# Patient Record
Sex: FEMALE | Race: AMERICAN INDIAN OR ALASKA NATIVE | NOT HISPANIC OR LATINO | Employment: OTHER | URBAN - METROPOLITAN AREA
[De-identification: names, ages, dates, MRNs, and addresses within clinical notes are randomized per-mention and may not be internally consistent; named-entity substitution may affect disease eponyms.]

---

## 2022-12-29 ENCOUNTER — ANESTHESIA EVENT (OUTPATIENT)
Dept: SURGERY | Facility: CLINIC | Age: 62
End: 2022-12-29
Payer: COMMERCIAL

## 2022-12-29 ENCOUNTER — APPOINTMENT (OUTPATIENT)
Dept: RADIOLOGY | Facility: CLINIC | Age: 62
End: 2022-12-29
Attending: EMERGENCY MEDICINE
Payer: COMMERCIAL

## 2022-12-29 ENCOUNTER — APPOINTMENT (OUTPATIENT)
Dept: RADIOLOGY | Facility: CLINIC | Age: 62
End: 2022-12-29
Attending: STUDENT IN AN ORGANIZED HEALTH CARE EDUCATION/TRAINING PROGRAM
Payer: COMMERCIAL

## 2022-12-29 ENCOUNTER — APPOINTMENT (OUTPATIENT)
Dept: CT IMAGING | Facility: CLINIC | Age: 62
End: 2022-12-29
Attending: EMERGENCY MEDICINE
Payer: COMMERCIAL

## 2022-12-29 ENCOUNTER — ANESTHESIA (OUTPATIENT)
Dept: SURGERY | Facility: CLINIC | Age: 62
End: 2022-12-29
Payer: COMMERCIAL

## 2022-12-29 ENCOUNTER — APPOINTMENT (OUTPATIENT)
Dept: RADIOLOGY | Facility: CLINIC | Age: 62
End: 2022-12-29
Payer: COMMERCIAL

## 2022-12-29 ENCOUNTER — HOSPITAL ENCOUNTER (OUTPATIENT)
Facility: CLINIC | Age: 62
Setting detail: OBSERVATION
Discharge: HOME | End: 2022-12-30
Attending: EMERGENCY MEDICINE | Admitting: EMERGENCY MEDICINE
Payer: COMMERCIAL

## 2022-12-29 DIAGNOSIS — S82.201A CLOSED FRACTURE OF RIGHT TIBIA AND FIBULA, INITIAL ENCOUNTER: ICD-10-CM

## 2022-12-29 DIAGNOSIS — S82.401A CLOSED FRACTURE OF RIGHT TIBIA AND FIBULA, INITIAL ENCOUNTER: ICD-10-CM
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
## 2022-12-29 LAB
ANION GAP SERPL CALCULATED.3IONS-SCNC: 12 MMOL/L (ref 5–18)
APTT PPP: 27 SECONDS (ref 22–38)
ATRIAL RATE - MUSE: 87 BPM
BASOPHILS # BLD AUTO: 0 10E3/UL (ref 0–0.2)
BASOPHILS NFR BLD AUTO: 0 %
BUN SERPL-MCNC: 5 MG/DL (ref 8–22)
CALCIUM SERPL-MCNC: 8.9 MG/DL (ref 8.5–10.5)
CHLORIDE BLD-SCNC: 108 MMOL/L (ref 98–107)
CO2 SERPL-SCNC: 20 MMOL/L (ref 22–31)
CREAT SERPL-MCNC: 0.62 MG/DL (ref 0.6–1.1)
DIASTOLIC BLOOD PRESSURE - MUSE: 76 MMHG
EOSINOPHIL # BLD AUTO: 0 10E3/UL (ref 0–0.7)
EOSINOPHIL NFR BLD AUTO: 0 %
ERYTHROCYTE [DISTWIDTH] IN BLOOD BY AUTOMATED COUNT: 12.1 % (ref 10–15)
GFR SERPL CREATININE-BSD FRML MDRD: >90 ML/MIN/1.73M2
GLUCOSE BLD-MCNC: 114 MG/DL (ref 70–125)
GLUCOSE BLDC GLUCOMTR-MCNC: 120 MG/DL (ref 70–99)
HCT VFR BLD AUTO: 41 % (ref 35–47)
HGB BLD-MCNC: 13.7 G/DL (ref 11.7–15.7)
IMM GRANULOCYTES # BLD: 0 10E3/UL
IMM GRANULOCYTES NFR BLD: 0 %
INR PPP: 1 (ref 0.85–1.15)
INTERPRETATION ECG - MUSE: NORMAL
LYMPHOCYTES # BLD AUTO: 1.1 10E3/UL (ref 0.8–5.3)
LYMPHOCYTES NFR BLD AUTO: 14 %
MCH RBC QN AUTO: 30.9 PG (ref 26.5–33)
MCHC RBC AUTO-ENTMCNC: 33.4 G/DL (ref 31.5–36.5)
MCV RBC AUTO: 93 FL (ref 78–100)
MONOCYTES # BLD AUTO: 0.6 10E3/UL (ref 0–1.3)
MONOCYTES NFR BLD AUTO: 7 %
NEUTROPHILS # BLD AUTO: 6 10E3/UL (ref 1.6–8.3)
NEUTROPHILS NFR BLD AUTO: 79 %
NRBC # BLD AUTO: 0 10E3/UL
NRBC BLD AUTO-RTO: 0 /100
P AXIS - MUSE: 59 DEGREES
PLATELET # BLD AUTO: 337 10E3/UL (ref 150–450)
POTASSIUM BLD-SCNC: 3.6 MMOL/L (ref 3.5–5)
PR INTERVAL - MUSE: 156 MS
QRS DURATION - MUSE: 84 MS
QT - MUSE: 348 MS
QTC - MUSE: 418 MS
R AXIS - MUSE: 62 DEGREES
RBC # BLD AUTO: 4.43 10E6/UL (ref 3.8–5.2)
SODIUM SERPL-SCNC: 140 MMOL/L (ref 136–145)
SYSTOLIC BLOOD PRESSURE - MUSE: 152 MMHG
T AXIS - MUSE: 75 DEGREES
VENTRICULAR RATE- MUSE: 87 BPM
WBC # BLD AUTO: 7.7 10E3/UL (ref 4–11)

## 2022-12-29 PROCEDURE — C1769 GUIDE WIRE: HCPCS | Performed by: STUDENT IN AN ORGANIZED HEALTH CARE EDUCATION/TRAINING PROGRAM

## 2022-12-29 PROCEDURE — 272N000001 HC OR GENERAL SUPPLY STERILE: Performed by: STUDENT IN AN ORGANIZED HEALTH CARE EDUCATION/TRAINING PROGRAM

## 2022-12-29 PROCEDURE — 120N000001 HC R&B MED SURG/OB

## 2022-12-29 PROCEDURE — 73700 CT LOWER EXTREMITY W/O DYE: CPT | Mod: RT

## 2022-12-29 PROCEDURE — 80048 BASIC METABOLIC PNL TOTAL CA: CPT | Performed by: EMERGENCY MEDICINE

## 2022-12-29 PROCEDURE — 85610 PROTHROMBIN TIME: CPT | Performed by: EMERGENCY MEDICINE

## 2022-12-29 PROCEDURE — 250N000009 HC RX 250: Performed by: ANESTHESIOLOGY

## 2022-12-29 PROCEDURE — 258N000003 HC RX IP 258 OP 636: Performed by: ANESTHESIOLOGY

## 2022-12-29 PROCEDURE — 36415 COLL VENOUS BLD VENIPUNCTURE: CPT | Performed by: EMERGENCY MEDICINE

## 2022-12-29 PROCEDURE — 258N000003 HC RX IP 258 OP 636

## 2022-12-29 PROCEDURE — 85730 THROMBOPLASTIN TIME PARTIAL: CPT | Performed by: EMERGENCY MEDICINE

## 2022-12-29 PROCEDURE — 93005 ELECTROCARDIOGRAM TRACING: CPT | Performed by: EMERGENCY MEDICINE

## 2022-12-29 PROCEDURE — 250N000009 HC RX 250: Performed by: STUDENT IN AN ORGANIZED HEALTH CARE EDUCATION/TRAINING PROGRAM

## 2022-12-29 PROCEDURE — 999N000141 HC STATISTIC PRE-PROCEDURE NURSING ASSESSMENT: Performed by: STUDENT IN AN ORGANIZED HEALTH CARE EDUCATION/TRAINING PROGRAM

## 2022-12-29 PROCEDURE — 73610 X-RAY EXAM OF ANKLE: CPT | Mod: RT

## 2022-12-29 PROCEDURE — 85025 COMPLETE CBC W/AUTO DIFF WBC: CPT | Performed by: EMERGENCY MEDICINE

## 2022-12-29 PROCEDURE — 710N000010 HC RECOVERY PHASE 1, LEVEL 2, PER MIN: Performed by: STUDENT IN AN ORGANIZED HEALTH CARE EDUCATION/TRAINING PROGRAM

## 2022-12-29 PROCEDURE — 258N000003 HC RX IP 258 OP 636: Performed by: NURSE ANESTHETIST, CERTIFIED REGISTERED

## 2022-12-29 PROCEDURE — 99219 PR INITIAL OBSERVATION CARE,LEVEL II: CPT | Mod: GC

## 2022-12-29 PROCEDURE — 29515 APPLICATION SHORT LEG SPLINT: CPT

## 2022-12-29 PROCEDURE — 250N000013 HC RX MED GY IP 250 OP 250 PS 637

## 2022-12-29 PROCEDURE — 73590 X-RAY EXAM OF LOWER LEG: CPT | Mod: RT

## 2022-12-29 PROCEDURE — 250N000013 HC RX MED GY IP 250 OP 250 PS 637: Performed by: EMERGENCY MEDICINE

## 2022-12-29 PROCEDURE — 250N000011 HC RX IP 250 OP 636: Performed by: STUDENT IN AN ORGANIZED HEALTH CARE EDUCATION/TRAINING PROGRAM

## 2022-12-29 PROCEDURE — 250N000009 HC RX 250: Performed by: NURSE ANESTHETIST, CERTIFIED REGISTERED

## 2022-12-29 PROCEDURE — 29505 APPLICATION LONG LEG SPLINT: CPT

## 2022-12-29 PROCEDURE — C1713 ANCHOR/SCREW BN/BN,TIS/BN: HCPCS | Performed by: STUDENT IN AN ORGANIZED HEALTH CARE EDUCATION/TRAINING PROGRAM

## 2022-12-29 PROCEDURE — 360N000084 HC SURGERY LEVEL 4 W/ FLUORO, PER MIN: Performed by: STUDENT IN AN ORGANIZED HEALTH CARE EDUCATION/TRAINING PROGRAM

## 2022-12-29 PROCEDURE — 999N000065 XR TIBIA & FIBULA PORT RIGHT 2 VIEWS: Mod: RT

## 2022-12-29 PROCEDURE — 250N000011 HC RX IP 250 OP 636: Performed by: NURSE ANESTHETIST, CERTIFIED REGISTERED

## 2022-12-29 PROCEDURE — 250N000011 HC RX IP 250 OP 636: Performed by: ANESTHESIOLOGY

## 2022-12-29 PROCEDURE — 370N000017 HC ANESTHESIA TECHNICAL FEE, PER MIN: Performed by: STUDENT IN AN ORGANIZED HEALTH CARE EDUCATION/TRAINING PROGRAM

## 2022-12-29 PROCEDURE — 999N000182 XR SURGERY CARM FLUORO GREATER THAN 5 MIN: Mod: TC

## 2022-12-29 PROCEDURE — 250N000025 HC SEVOFLURANE, PER MIN: Performed by: STUDENT IN AN ORGANIZED HEALTH CARE EDUCATION/TRAINING PROGRAM

## 2022-12-29 PROCEDURE — 99285 EMERGENCY DEPT VISIT HI MDM: CPT | Mod: 25

## 2022-12-29 PROCEDURE — 250N000011 HC RX IP 250 OP 636: Performed by: ORTHOPAEDIC SURGERY

## 2022-12-29 PROCEDURE — 278N000051 HC OR IMPLANT GENERAL: Performed by: STUDENT IN AN ORGANIZED HEALTH CARE EDUCATION/TRAINING PROGRAM

## 2022-12-29 DEVICE — IMP SCR SYN 3.5X16MM LOCKING W/STARDRIVE SS 212.104: Type: IMPLANTABLE DEVICE | Site: LEG | Status: FUNCTIONAL

## 2022-12-29 DEVICE — IMP SCR SYN CAN 4.0X40MM FT SS 206.040: Type: IMPLANTABLE DEVICE | Site: LEG | Status: FUNCTIONAL

## 2022-12-29 DEVICE — IMP SCR SYN CORTEX 3.5X32MM SELF TAP SS 204.832: Type: IMPLANTABLE DEVICE | Site: LEG | Status: FUNCTIONAL

## 2022-12-29 DEVICE — IMP SCR SYN CORTEX 3.5X16MM SELF TAP SS 204.816: Type: IMPLANTABLE DEVICE | Site: LEG | Status: FUNCTIONAL

## 2022-12-29 DEVICE — LOCKING SCREW FOR IM NAIL Ø 5MM/ 42MM/ XL25/ STERILE: Type: IMPLANTABLE DEVICE | Site: LEG | Status: FUNCTIONAL

## 2022-12-29 DEVICE — IMP SCR SYN CORTEX 3.5X14MM SELF TAP SS 204.814: Type: IMPLANTABLE DEVICE | Site: LEG | Status: FUNCTIONAL

## 2022-12-29 DEVICE — IMP PLATE SYN 1/3 TUBULAR 93MM 08H SS 241.381: Type: IMPLANTABLE DEVICE | Site: LEG | Status: FUNCTIONAL

## 2022-12-29 DEVICE — IMP PLATE SYN 1/3 TUBULAR 57MM 05H SS 241.351: Type: IMPLANTABLE DEVICE | Site: LEG | Status: FUNCTIONAL

## 2022-12-29 DEVICE — LOCKING SCREW FOR IM NAIL Ø 5MM/ 32MM/ XL25/ STERILE: Type: IMPLANTABLE DEVICE | Site: LEG | Status: FUNCTIONAL

## 2022-12-29 DEVICE — IMP SCR SYN CORTEX 3.5X38MM SELF TAP SS 204.838: Type: IMPLANTABLE DEVICE | Site: LEG | Status: FUNCTIONAL

## 2022-12-29 DEVICE — LOCKING SCREW FOR IM NAIL Ø 5MM/ 44MM/ XL25/ STERILE: Type: IMPLANTABLE DEVICE | Site: LEG | Status: FUNCTIONAL

## 2022-12-29 DEVICE — LOCKING SCREW FOR IM NAIL Ø 5MM/ 36MM/ XL25/ STERILE: Type: IMPLANTABLE DEVICE | Site: LEG | Status: FUNCTIONAL

## 2022-12-29 DEVICE — IMP SCR SYN CORTEX 2.7X16MM SELF TAP SS 202.816: Type: IMPLANTABLE DEVICE | Site: LEG | Status: FUNCTIONAL

## 2022-12-29 DEVICE — IMP SCR SYN CORTEX 3.5X36MM SELF TAP SS 204.836: Type: IMPLANTABLE DEVICE | Site: LEG | Status: FUNCTIONAL

## 2022-12-29 DEVICE — IMP SCR SYN 3.5X14MM LOCKING W/STARDRIVE SS 212.103: Type: IMPLANTABLE DEVICE | Site: LEG | Status: FUNCTIONAL

## 2022-12-29 DEVICE — IMP SCR SYN CORTEX 3.5X34MM SELF TAP SS 204.834: Type: IMPLANTABLE DEVICE | Site: LEG | Status: FUNCTIONAL

## 2022-12-29 DEVICE — IMP SCR SYN CORTEX 3.5X30MM SELF TAP SS 204.830: Type: IMPLANTABLE DEVICE | Site: LEG | Status: FUNCTIONAL

## 2022-12-29 DEVICE — LOCKING SCREW FOR IM NAIL Ø 5MM/ 40MM/ XL25/ STERILE: Type: IMPLANTABLE DEVICE | Site: LEG | Status: FUNCTIONAL

## 2022-12-29 RX ORDER — CEFAZOLIN SODIUM 2 G/100ML
2 INJECTION, SOLUTION INTRAVENOUS
Status: DISCONTINUED | OUTPATIENT
Start: 2022-12-29 | End: 2022-12-29

## 2022-12-29 RX ORDER — LIDOCAINE 40 MG/G
CREAM TOPICAL
Status: DISCONTINUED | OUTPATIENT
Start: 2022-12-29 | End: 2022-12-30 | Stop reason: HOSPADM

## 2022-12-29 RX ORDER — LIDOCAINE 40 MG/G
CREAM TOPICAL
Status: DISCONTINUED | OUTPATIENT
Start: 2022-12-29 | End: 2022-12-29 | Stop reason: HOSPADM

## 2022-12-29 RX ORDER — OXYCODONE HYDROCHLORIDE 5 MG/1
5 TABLET ORAL
Status: DISCONTINUED | OUTPATIENT
Start: 2022-12-29 | End: 2022-12-29

## 2022-12-29 RX ORDER — ONDANSETRON 4 MG/1
4 TABLET, ORALLY DISINTEGRATING ORAL EVERY 6 HOURS PRN
Status: DISCONTINUED | OUTPATIENT
Start: 2022-12-29 | End: 2022-12-30 | Stop reason: HOSPADM

## 2022-12-29 RX ORDER — ONDANSETRON 2 MG/ML
4 INJECTION INTRAMUSCULAR; INTRAVENOUS EVERY 6 HOURS PRN
Status: DISCONTINUED | OUTPATIENT
Start: 2022-12-29 | End: 2022-12-29

## 2022-12-29 RX ORDER — ONDANSETRON 2 MG/ML
4 INJECTION INTRAMUSCULAR; INTRAVENOUS EVERY 6 HOURS PRN
Status: DISCONTINUED | OUTPATIENT
Start: 2022-12-29 | End: 2022-12-30 | Stop reason: HOSPADM

## 2022-12-29 RX ORDER — HYDROMORPHONE HCL IN WATER/PF 6 MG/30 ML
0.2 PATIENT CONTROLLED ANALGESIA SYRINGE INTRAVENOUS
Status: DISCONTINUED | OUTPATIENT
Start: 2022-12-29 | End: 2022-12-30 | Stop reason: HOSPADM

## 2022-12-29 RX ORDER — FENTANYL CITRATE 50 UG/ML
50 INJECTION, SOLUTION INTRAMUSCULAR; INTRAVENOUS EVERY 5 MIN PRN
Status: DISCONTINUED | OUTPATIENT
Start: 2022-12-29 | End: 2022-12-29

## 2022-12-29 RX ORDER — FENTANYL CITRATE 50 UG/ML
25 INJECTION, SOLUTION INTRAMUSCULAR; INTRAVENOUS EVERY 5 MIN PRN
Status: DISCONTINUED | OUTPATIENT
Start: 2022-12-29 | End: 2022-12-29

## 2022-12-29 RX ORDER — ACETAMINOPHEN 325 MG/1
650 TABLET ORAL EVERY 4 HOURS PRN
Status: DISCONTINUED | OUTPATIENT
Start: 2023-01-01 | End: 2022-12-30 | Stop reason: HOSPADM

## 2022-12-29 RX ORDER — PROCHLORPERAZINE MALEATE 10 MG
10 TABLET ORAL EVERY 6 HOURS PRN
Status: DISCONTINUED | OUTPATIENT
Start: 2022-12-29 | End: 2022-12-30 | Stop reason: HOSPADM

## 2022-12-29 RX ORDER — OXYCODONE HYDROCHLORIDE 5 MG/1
5 TABLET ORAL EVERY 4 HOURS PRN
Status: DISCONTINUED | OUTPATIENT
Start: 2022-12-29 | End: 2022-12-30 | Stop reason: HOSPADM

## 2022-12-29 RX ORDER — ACETAMINOPHEN 325 MG/1
975 TABLET ORAL EVERY 8 HOURS
Status: DISCONTINUED | OUTPATIENT
Start: 2022-12-29 | End: 2022-12-29

## 2022-12-29 RX ORDER — LABETALOL HYDROCHLORIDE 5 MG/ML
10 INJECTION, SOLUTION INTRAVENOUS
Status: DISCONTINUED | OUTPATIENT
Start: 2022-12-29 | End: 2022-12-30 | Stop reason: HOSPADM

## 2022-12-29 RX ORDER — HYDRALAZINE HYDROCHLORIDE 20 MG/ML
10 INJECTION INTRAMUSCULAR; INTRAVENOUS EVERY 6 HOURS PRN
Status: DISCONTINUED | OUTPATIENT
Start: 2022-12-29 | End: 2022-12-30 | Stop reason: HOSPADM

## 2022-12-29 RX ORDER — CEFAZOLIN SODIUM 1 G/3ML
1 INJECTION, POWDER, FOR SOLUTION INTRAMUSCULAR; INTRAVENOUS EVERY 8 HOURS
Status: COMPLETED | OUTPATIENT
Start: 2022-12-30 | End: 2022-12-30

## 2022-12-29 RX ORDER — NALOXONE HYDROCHLORIDE 0.4 MG/ML
0.2 INJECTION, SOLUTION INTRAMUSCULAR; INTRAVENOUS; SUBCUTANEOUS
Status: DISCONTINUED | OUTPATIENT
Start: 2022-12-29 | End: 2022-12-30 | Stop reason: HOSPADM

## 2022-12-29 RX ORDER — FENTANYL CITRATE 50 UG/ML
100 INJECTION, SOLUTION INTRAMUSCULAR; INTRAVENOUS
Status: COMPLETED | OUTPATIENT
Start: 2022-12-29 | End: 2022-12-29

## 2022-12-29 RX ORDER — CEFAZOLIN SODIUM 2 G/100ML
2 INJECTION, SOLUTION INTRAVENOUS SEE ADMIN INSTRUCTIONS
Status: DISCONTINUED | OUTPATIENT
Start: 2022-12-29 | End: 2022-12-29

## 2022-12-29 RX ORDER — HYDROMORPHONE HCL IN WATER/PF 6 MG/30 ML
0.4 PATIENT CONTROLLED ANALGESIA SYRINGE INTRAVENOUS
Status: DISCONTINUED | OUTPATIENT
Start: 2022-12-29 | End: 2022-12-30 | Stop reason: HOSPADM

## 2022-12-29 RX ORDER — POLYETHYLENE GLYCOL 3350 17 G/17G
17 POWDER, FOR SOLUTION ORAL DAILY
Status: DISCONTINUED | OUTPATIENT
Start: 2022-12-30 | End: 2022-12-30 | Stop reason: HOSPADM

## 2022-12-29 RX ORDER — BUPIVACAINE HYDROCHLORIDE 2.5 MG/ML
INJECTION, SOLUTION EPIDURAL; INFILTRATION; INTRACAUDAL PRN
Status: DISCONTINUED | OUTPATIENT
Start: 2022-12-29 | End: 2022-12-29 | Stop reason: HOSPADM

## 2022-12-29 RX ORDER — LIDOCAINE 40 MG/G
CREAM TOPICAL
Status: DISCONTINUED | OUTPATIENT
Start: 2022-12-29 | End: 2022-12-29

## 2022-12-29 RX ORDER — CEFAZOLIN SODIUM/WATER 2 G/20 ML
2 SYRINGE (ML) INTRAVENOUS SEE ADMIN INSTRUCTIONS
Status: DISCONTINUED | OUTPATIENT
Start: 2022-12-29 | End: 2022-12-29 | Stop reason: HOSPADM

## 2022-12-29 RX ORDER — SODIUM CHLORIDE, SODIUM LACTATE, POTASSIUM CHLORIDE, CALCIUM CHLORIDE 600; 310; 30; 20 MG/100ML; MG/100ML; MG/100ML; MG/100ML
INJECTION, SOLUTION INTRAVENOUS CONTINUOUS
Status: DISCONTINUED | OUTPATIENT
Start: 2022-12-29 | End: 2022-12-29 | Stop reason: HOSPADM

## 2022-12-29 RX ORDER — DEXAMETHASONE SODIUM PHOSPHATE 10 MG/ML
INJECTION, SOLUTION INTRAMUSCULAR; INTRAVENOUS PRN
Status: DISCONTINUED | OUTPATIENT
Start: 2022-12-29 | End: 2022-12-29

## 2022-12-29 RX ORDER — ONDANSETRON 2 MG/ML
4 INJECTION INTRAMUSCULAR; INTRAVENOUS ONCE
Status: DISCONTINUED | OUTPATIENT
Start: 2022-12-29 | End: 2022-12-30 | Stop reason: HOSPADM

## 2022-12-29 RX ORDER — PROPOFOL 10 MG/ML
INJECTION, EMULSION INTRAVENOUS PRN
Status: DISCONTINUED | OUTPATIENT
Start: 2022-12-29 | End: 2022-12-29

## 2022-12-29 RX ORDER — SODIUM CHLORIDE, SODIUM LACTATE, POTASSIUM CHLORIDE, CALCIUM CHLORIDE 600; 310; 30; 20 MG/100ML; MG/100ML; MG/100ML; MG/100ML
INJECTION, SOLUTION INTRAVENOUS CONTINUOUS
Status: DISCONTINUED | OUTPATIENT
Start: 2022-12-29 | End: 2022-12-30

## 2022-12-29 RX ORDER — ONDANSETRON 4 MG/1
4 TABLET, ORALLY DISINTEGRATING ORAL EVERY 6 HOURS PRN
Status: DISCONTINUED | OUTPATIENT
Start: 2022-12-29 | End: 2022-12-29

## 2022-12-29 RX ORDER — BISACODYL 10 MG
10 SUPPOSITORY, RECTAL RECTAL DAILY PRN
Status: DISCONTINUED | OUTPATIENT
Start: 2022-12-29 | End: 2022-12-30 | Stop reason: HOSPADM

## 2022-12-29 RX ORDER — AMLODIPINE BESYLATE 10 MG/1
10 TABLET ORAL DAILY
COMMUNITY

## 2022-12-29 RX ORDER — AMOXICILLIN 250 MG
1 CAPSULE ORAL 2 TIMES DAILY
Status: DISCONTINUED | OUTPATIENT
Start: 2022-12-29 | End: 2022-12-30 | Stop reason: HOSPADM

## 2022-12-29 RX ORDER — MAGNESIUM SULFATE 4 G/50ML
4 INJECTION INTRAVENOUS ONCE
Status: COMPLETED | OUTPATIENT
Start: 2022-12-29 | End: 2022-12-29

## 2022-12-29 RX ORDER — SODIUM CHLORIDE 9 MG/ML
INJECTION, SOLUTION INTRAVENOUS CONTINUOUS
Status: DISCONTINUED | OUTPATIENT
Start: 2022-12-29 | End: 2022-12-30

## 2022-12-29 RX ORDER — CEFAZOLIN SODIUM/WATER 2 G/20 ML
2 SYRINGE (ML) INTRAVENOUS
Status: COMPLETED | OUTPATIENT
Start: 2022-12-29 | End: 2022-12-29

## 2022-12-29 RX ORDER — MEPERIDINE HYDROCHLORIDE 50 MG/ML
12.5 INJECTION INTRAMUSCULAR; INTRAVENOUS; SUBCUTANEOUS EVERY 5 MIN PRN
Status: DISCONTINUED | OUTPATIENT
Start: 2022-12-29 | End: 2022-12-30 | Stop reason: HOSPADM

## 2022-12-29 RX ORDER — NALOXONE HYDROCHLORIDE 0.4 MG/ML
0.4 INJECTION, SOLUTION INTRAMUSCULAR; INTRAVENOUS; SUBCUTANEOUS
Status: DISCONTINUED | OUTPATIENT
Start: 2022-12-29 | End: 2022-12-30 | Stop reason: HOSPADM

## 2022-12-29 RX ORDER — ALBUTEROL SULFATE 0.83 MG/ML
2.5 SOLUTION RESPIRATORY (INHALATION) EVERY 4 HOURS PRN
Status: DISCONTINUED | OUTPATIENT
Start: 2022-12-29 | End: 2022-12-30 | Stop reason: HOSPADM

## 2022-12-29 RX ORDER — OXYCODONE AND ACETAMINOPHEN 5; 325 MG/1; MG/1
1 TABLET ORAL ONCE
Status: COMPLETED | OUTPATIENT
Start: 2022-12-29 | End: 2022-12-29

## 2022-12-29 RX ORDER — MORPHINE SULFATE 4 MG/ML
4 INJECTION, SOLUTION INTRAMUSCULAR; INTRAVENOUS EVERY 30 MIN PRN
Status: DISCONTINUED | OUTPATIENT
Start: 2022-12-29 | End: 2022-12-29

## 2022-12-29 RX ORDER — IBUPROFEN 600 MG/1
600 TABLET, FILM COATED ORAL ONCE
Status: COMPLETED | OUTPATIENT
Start: 2022-12-29 | End: 2022-12-29

## 2022-12-29 RX ORDER — ASPIRIN 81 MG/1
81 TABLET ORAL 2 TIMES DAILY
Status: DISCONTINUED | OUTPATIENT
Start: 2022-12-29 | End: 2022-12-30 | Stop reason: HOSPADM

## 2022-12-29 RX ORDER — POLYETHYLENE GLYCOL 3350 17 G/17G
17 POWDER, FOR SOLUTION ORAL DAILY PRN
Status: DISCONTINUED | OUTPATIENT
Start: 2022-12-29 | End: 2022-12-30 | Stop reason: HOSPADM

## 2022-12-29 RX ORDER — ONDANSETRON 2 MG/ML
4 INJECTION INTRAMUSCULAR; INTRAVENOUS EVERY 30 MIN PRN
Status: DISCONTINUED | OUTPATIENT
Start: 2022-12-29 | End: 2022-12-29

## 2022-12-29 RX ORDER — ONDANSETRON 4 MG/1
4 TABLET, ORALLY DISINTEGRATING ORAL EVERY 30 MIN PRN
Status: DISCONTINUED | OUTPATIENT
Start: 2022-12-29 | End: 2022-12-29

## 2022-12-29 RX ORDER — ONDANSETRON 2 MG/ML
INJECTION INTRAMUSCULAR; INTRAVENOUS PRN
Status: DISCONTINUED | OUTPATIENT
Start: 2022-12-29 | End: 2022-12-29

## 2022-12-29 RX ORDER — AMLODIPINE BESYLATE 10 MG/1
10 TABLET ORAL DAILY
Status: DISCONTINUED | OUTPATIENT
Start: 2022-12-29 | End: 2022-12-30 | Stop reason: HOSPADM

## 2022-12-29 RX ORDER — HYDROMORPHONE HCL IN WATER/PF 6 MG/30 ML
0.2 PATIENT CONTROLLED ANALGESIA SYRINGE INTRAVENOUS EVERY 5 MIN PRN
Status: DISCONTINUED | OUTPATIENT
Start: 2022-12-29 | End: 2022-12-29

## 2022-12-29 RX ORDER — SODIUM CHLORIDE, SODIUM LACTATE, POTASSIUM CHLORIDE, CALCIUM CHLORIDE 600; 310; 30; 20 MG/100ML; MG/100ML; MG/100ML; MG/100ML
INJECTION, SOLUTION INTRAVENOUS CONTINUOUS
Status: DISCONTINUED | OUTPATIENT
Start: 2022-12-29 | End: 2022-12-29

## 2022-12-29 RX ORDER — HALOPERIDOL 5 MG/ML
1 INJECTION INTRAMUSCULAR
Status: DISCONTINUED | OUTPATIENT
Start: 2022-12-29 | End: 2022-12-30 | Stop reason: HOSPADM

## 2022-12-29 RX ORDER — HYDROMORPHONE HCL IN WATER/PF 6 MG/30 ML
0.4 PATIENT CONTROLLED ANALGESIA SYRINGE INTRAVENOUS EVERY 5 MIN PRN
Status: DISCONTINUED | OUTPATIENT
Start: 2022-12-29 | End: 2022-12-29

## 2022-12-29 RX ORDER — MAGNESIUM HYDROXIDE 1200 MG/15ML
LIQUID ORAL PRN
Status: DISCONTINUED | OUTPATIENT
Start: 2022-12-29 | End: 2022-12-29 | Stop reason: HOSPADM

## 2022-12-29 RX ORDER — ACETAMINOPHEN 325 MG/1
975 TABLET ORAL EVERY 8 HOURS
Status: DISCONTINUED | OUTPATIENT
Start: 2022-12-29 | End: 2022-12-30 | Stop reason: HOSPADM

## 2022-12-29 RX ADMIN — LIDOCAINE HYDROCHLORIDE 20 MG: 10 INJECTION, SOLUTION INFILTRATION; PERINEURAL at 13:16

## 2022-12-29 RX ADMIN — IBUPROFEN 600 MG: 600 TABLET ORAL at 02:27

## 2022-12-29 RX ADMIN — ACETAMINOPHEN 975 MG: 325 TABLET ORAL at 09:45

## 2022-12-29 RX ADMIN — SUGAMMADEX 200 MG: 100 INJECTION, SOLUTION INTRAVENOUS at 17:03

## 2022-12-29 RX ADMIN — AMLODIPINE BESYLATE 10 MG: 10 TABLET ORAL at 09:45

## 2022-12-29 RX ADMIN — FENTANYL CITRATE 100 MCG: 50 INJECTION, SOLUTION INTRAMUSCULAR; INTRAVENOUS at 13:16

## 2022-12-29 RX ADMIN — MIDAZOLAM 2 MG: 1 INJECTION INTRAMUSCULAR; INTRAVENOUS at 13:05

## 2022-12-29 RX ADMIN — ACETAMINOPHEN 975 MG: 325 TABLET ORAL at 20:02

## 2022-12-29 RX ADMIN — ACETAMINOPHEN 325 MG: 325 TABLET ORAL at 12:46

## 2022-12-29 RX ADMIN — ROCURONIUM BROMIDE 10 MG: 10 INJECTION, SOLUTION INTRAVENOUS at 15:00

## 2022-12-29 RX ADMIN — SENNOSIDES AND DOCUSATE SODIUM 1 TABLET: 50; 8.6 TABLET ORAL at 20:02

## 2022-12-29 RX ADMIN — DEXAMETHASONE SODIUM PHOSPHATE 10 MG: 10 INJECTION, SOLUTION INTRAMUSCULAR; INTRAVENOUS at 13:16

## 2022-12-29 RX ADMIN — Medication 2 G: at 13:05

## 2022-12-29 RX ADMIN — ROCURONIUM BROMIDE 10 MG: 10 INJECTION, SOLUTION INTRAVENOUS at 16:19

## 2022-12-29 RX ADMIN — PHENYLEPHRINE HYDROCHLORIDE 200 MCG: 10 INJECTION INTRAVENOUS at 13:58

## 2022-12-29 RX ADMIN — ROCURONIUM BROMIDE 10 MG: 10 INJECTION, SOLUTION INTRAVENOUS at 16:03

## 2022-12-29 RX ADMIN — ROCURONIUM BROMIDE 50 MG: 10 INJECTION, SOLUTION INTRAVENOUS at 13:16

## 2022-12-29 RX ADMIN — SODIUM CHLORIDE: 9 INJECTION, SOLUTION INTRAVENOUS at 22:03

## 2022-12-29 RX ADMIN — SODIUM CHLORIDE: 9 INJECTION, SOLUTION INTRAVENOUS at 07:34

## 2022-12-29 RX ADMIN — ROCURONIUM BROMIDE 20 MG: 10 INJECTION, SOLUTION INTRAVENOUS at 14:27

## 2022-12-29 RX ADMIN — OXYCODONE HYDROCHLORIDE AND ACETAMINOPHEN 1 TABLET: 5; 325 TABLET ORAL at 02:27

## 2022-12-29 RX ADMIN — MAGNESIUM SULFATE HEPTAHYDRATE 4 G: 80 INJECTION, SOLUTION INTRAVENOUS at 12:49

## 2022-12-29 RX ADMIN — Medication 2 G: at 17:05

## 2022-12-29 RX ADMIN — SODIUM CHLORIDE, POTASSIUM CHLORIDE, SODIUM LACTATE AND CALCIUM CHLORIDE: 600; 310; 30; 20 INJECTION, SOLUTION INTRAVENOUS at 14:30

## 2022-12-29 RX ADMIN — ONDANSETRON 4 MG: 2 INJECTION INTRAMUSCULAR; INTRAVENOUS at 13:16

## 2022-12-29 RX ADMIN — ASPIRIN 81 MG: 81 TABLET ORAL at 20:02

## 2022-12-29 RX ADMIN — SODIUM CHLORIDE, POTASSIUM CHLORIDE, SODIUM LACTATE AND CALCIUM CHLORIDE: 600; 310; 30; 20 INJECTION, SOLUTION INTRAVENOUS at 13:00

## 2022-12-29 RX ADMIN — HYDROMORPHONE HYDROCHLORIDE 1 MG: 1 INJECTION, SOLUTION INTRAMUSCULAR; INTRAVENOUS; SUBCUTANEOUS at 14:25

## 2022-12-29 RX ADMIN — PROPOFOL 200 MG: 10 INJECTION, EMULSION INTRAVENOUS at 13:16

## 2022-12-29 RX ADMIN — PROPOFOL 100 MCG/KG/MIN: 10 INJECTION, EMULSION INTRAVENOUS at 13:30

## 2022-12-29 ASSESSMENT — ACTIVITIES OF DAILY LIVING (ADL)
ADLS_ACUITY_SCORE: 42
ADLS_ACUITY_SCORE: 35
ADLS_ACUITY_SCORE: 37
ADLS_ACUITY_SCORE: 35
ADLS_ACUITY_SCORE: 37
ADLS_ACUITY_SCORE: 35
ADLS_ACUITY_SCORE: 35
ADLS_ACUITY_SCORE: 37

## 2022-12-29 ASSESSMENT — ENCOUNTER SYMPTOMS
ARTHRALGIAS: 1
SEIZURES: 0

## 2022-12-29 ASSESSMENT — COPD QUESTIONNAIRES: COPD: 0

## 2022-12-29 NOTE — ANESTHESIA PROCEDURE NOTES
Airway       Patient location during procedure: OR       Procedure Start/Stop Times: 12/29/2022 1:19 PM  Staff -        Anesthesiologist:  Brayden Staton MD       CRNA: Jeanie Bunch APRN CRNA       Performed By: CRNA  Consent for Airway        Urgency: elective  Indications and Patient Condition       Indications for airway management: soniya-procedural and airway protection       Induction type:intravenous       Mask difficulty assessment: 2 - vent by mask + OA or adjuvant +/- NMBA    Final Airway Details       Final airway type: endotracheal airway       Successful airway: ETT - single  Endotracheal Airway Details        ETT size (mm): 7.0       Cuffed: yes       Successful intubation technique: video laryngoscopy       VL Blade Size: Glidescope 3       Grade View of Cords: 1       Adjucts: stylet       Position: Right       Measured from: lips       Secured at (cm): 21       Bite block used: None    Post intubation assessment        Placement verified by: capnometry, equal breath sounds and chest rise        Number of attempts at approach: 1       Number of other approaches attempted: 0       Secured with: silk tape and commercial tube diaz       Ease of procedure: easy       Dentition: Intact and Unchanged    Medication(s) Administered   Medication Administration Time: 12/29/2022 1:19 PM

## 2022-12-29 NOTE — H&P
"    [facility]    History and Physical - [facility]      Date of Admission:  12/29/2022    Assessment & Plan      Jaja Pratt is a 62 year old female admitted on 12/29/2022. She has a history of HTN, obesity and is admitted for distal ankle fractures.    Ankle injury  Fibular fracture  Tibial fracture  Mechanical fall with right ankle injury. Unable to ambulate. Xray showed midshaft tibial fracture and a distal fibula fracture. CT also showed medial malleolus fracture. Pre-op labs unremarkble, EKG with NSR. Ortho plans to take her to surgery per ED physician note.   -orthopedics consulted, appreciate recs  -NPO  -Follow up CT ankle  -oxycodone 5 mg q3 hours PRN  -scheduled tylenol  -bedrest  -mIVF    HTN  -PRN hydralazine       Diet: NPO per Anesthesia Guidelines for Procedure/Surgery Except for: Meds  DVT Prophylaxis: mechanical  Dorman Catheter: Not present  Fluids: IV  Central Lines: None  Cardiac Monitoring: None  Code Status: Full Code    Clinically Significant Risk Factors Present on Admission                       # Obesity: Estimated body mass index is 31.09 kg/m  as calculated from the following:    Height as of this encounter: 1.575 m (5' 2\").    Weight as of this encounter: 77.1 kg (170 lb).           Disposition Plan      Expected Discharge Date: 12/31/2022                The patient's care was discussed with the Attending Physician, Dr. Bar. Patient will be seen by Dr Souza in the AM.    Mayi Hamilton MD PGY2  [facility]  [facility]  Securely message with the Vocera Web Console (learn more here)  Text page via [employer] Paging/Directory       ______________________________________________________________________    Chief Complaint   Ankle injury    History is obtained from the patient    History of Present Illness   Jaja Pratt is a 62 year old female admitted on 12/29/2022. She has a history of HTN, obesity " and presented for ankle injury.    Patient tripped going down stairs. Her pants were too long and she tripped on the last 2 stairs. She rolled her right ankle. She could not walk after the incident. Ankle was very painful. She did not hit her head when she fell. She can feel her toes and move them still. Her pain is currently under control.     She has had surgery for her left wrist before, and did not have problems with anesthesia.     Review of Systems    The 10 point Review of Systems is negative other than noted in the HPI or here.     Past Medical History    I have reviewed this patient's medical history and updated it with pertinent information if needed.   Past Medical History:   Diagnosis Date     Benign essential hypertension         Past Surgical History   I have reviewed this patient's surgical history and updated it with pertinent information if needed.  Past Surgical History:   Procedure Laterality Date     WRIST SURGERY Left         Social History   I have reviewed this patient's social history and updated it with pertinent information if needed. Jaja Hollingsworthvamsibrandon  reports that she has never smoked. She does not have any smokeless tobacco history on file. She reports current alcohol use of about 2.0 standard drinks per week. She reports that she does not use drugs.    Family History   I have reviewed this patient's family history and updated it with pertinent information if needed.  Family History   Problem Relation Age of Onset     Hypertension Mother      Hypertension Father        Prior to Admission Medications   None     Allergies   No Known Allergies    Physical Exam   Vital Signs: Temp: 97.8  F (36.6  C) Temp src: Oral BP: (!) 146/77 Pulse: 83   Resp: 22 SpO2: 94 % O2 Device: None (Room air)    Weight: 170 lbs 0 oz    Constitutional: awake, alert, cooperative, no apparent distress, and appears stated age  Eyes: Lids and lashes normal, pupils equal, round and reactive to light, extra ocular muscles  intact, sclera clear, conjunctiva normal  ENT: normocepalic, without obvious abnormality, atramatic, MMM  Respiratory: No increased work of breathing, good air exchange, clear to auscultation bilaterally, no crackles or wheezing  Cardiovascular: Regular rate and rhythm, normal S1 and S2, no S3 or S4, and no murmur noted  GI: normal bowel sounds, soft, non-distended, non-tender, no masses palpated, no hepatosplenomegally  Skin: no bruising or bleeding  Musculoskeletal: RLE with sugar tong casting, sensory of distal toes intact, able to move toes. LLE no sensory or motor deficits.   Neurologic: Awake, alert, oriented to name, place and time.  Cranial nerves II-XII are grossly intact.    Neuropsychiatric: General: normal and calm  Level of consciousness: alert / normal    Data   Data reviewed today: I reviewed all medications, new labs and imaging results over the last 24 hours. I personally reviewed the EKG tracing showing NSR.    Recent Labs   Lab 12/29/22  0500   WBC 7.7   HGB 13.7   MCV 93      INR 1.00      POTASSIUM 3.6   CHLORIDE 108*   CO2 20*   BUN 5*   CR 0.62   ANIONGAP 12   MARA 8.9          Recent Results (from the past 24 hour(s))   XR Tibia and Fibula Right 2 Views    Narrative    EXAM: XR TIBIA AND FIBULA RIGHT 2 VIEWS  LOCATION: [facility]  DATE/TIME: 12/29/2022 4:00 AM    INDICATION: Fall, trauma.  COMPARISON: X-ray right ankle 3 views 12/29/2022 at 0358 hours.      Impression    IMPRESSION: Distal right tibia and fibula are not entirely included in the field-of-study, better demonstrated on right ankle images (please see separate report). Comminuted distal right fibular metaphyseal fracture. Comminuted distal metadiaphyseal   right tibial fracture. Soft tissue swelling and deformity. Mild degenerative changes right knee joint.                     Ankle XR, G/E 3 views, right    Narrative    EXAM: XR ANKLE RIGHT G/E 3 VIEWS  LOCATION: [facility]  [facility]  DATE/TIME: 12/29/2022 4:01 AM    INDICATION: Right ankle pain after fall.  COMPARISON: X-ray right tibia and fibula 2 views 12/29/2022 at 0352 hours.      Impression    IMPRESSION: Comminuted distal right fibular metaphyseal fracture. Partially visualized comminuted right tibial diaphyseal fracture. Subtle lucency along the medial malleolus, worrisome for an undisplaced fracture. Well-corticated density inferior to the   medial malleolus looks like a remote avulsion fragment. Ankle mortise is symmetric. Talar dome is smooth. Diffuse soft tissue swelling right leg and about the right ankle, more laterally.                CT Ankle Right w/o Contrast    Narrative    EXAM: CT ANKLE RIGHT W/O CONTRAST  LOCATION: [facility]  DATE/TIME: 12/29/2022 5:23 AM    INDICATION: Comminuted fracture distal right fibular metaphysis as well as distal right tibial diaphyseal fracture. Possible subtle lucency along the medial malleolus. Evaluate for medial malleolar fracture.  COMPARISON: Right ankle radiograph 12/29/2022.  TECHNIQUE: Noncontrast. Axial, sagittal and coronal thin-section reconstruction. Dose reduction techniques were used.     FINDINGS:     BONES:  -Oblique fracture through the anterior aspect of the medial malleolus extending from the anterior medial aspect of the medial malleolus through the central aspect of the tibial plafond to the posterior lateral corner of the tibial plafond (series 5,   images 87-90). No significant displacement or angulation of this oblique fracture.    -Spiral comminuted distal right tibial diaphyseal fracture. Minimal anterior displacement of the cortex relative to the posterior cortex by 7 mm (5-39). No other tibial fractures.    -Comminuted oblique minimally displaced fracture posterior distal left fibula with minimal posterior displacement of the distal fracture fragment by 3 mm. No evidence for widening of the ankle mortise or tibiofibular  syndesmosis.    -Talus, calcaneus and navicular as well as the visualized aspects of the cuneiforms, cuboid and metatarsals unremarkable.    SOFT TISSUES:  -High density subcutaneous fluid in the anterior aspect of the right lower leg at the spiral tibial diaphyseal fracture site compatible with subcutaneous hematoma.    -Minimal subcutaneous edema or hematoma adjacent to the fibular fracture.    -No muscular or tendinous abnormality.      Impression    IMPRESSION:  1.  Oblique fracture through the anterior aspect of the medial malleolus extending from the anterior medial malleolar surface through the central aspect of the tibial plafond to the posterolateral corner of the tibial plafond. No significant articular   surface offset or displacement of the fracture fragments. No angulation.    2.  Oblique minimally displaced fracture of the distal right fibula as detailed above. No significant widening of the tibiofibular syndesmosis.    3.  Spiral comminuted minimally displaced fracture involving the distal tibial diaphysis.    4.  Subcutaneous edema/hematoma in the right lower leg and ankle as detailed above.

## 2022-12-29 NOTE — ED NOTES
Report given to receiving nurse in [facility] patient transferred to OR for surgery.  Mckay Man RN  12/29/2022  12:07 PM

## 2022-12-29 NOTE — CONSULTS
ORTHOPEDIC CONSULTATION    Consultation  Jaja Pratt,  1960, MRN [id number]    [unfilled]  Closed fracture of right tibia and fibula, initial encounter [L42.201A, S82.401A]    PCP: Ellen Skinner, [phone]   Code status:  Full Code       Extended Emergency Contact Information  Primary Emergency Contact: Inés Islas  Mobile Phone: [phone]  Relation: Daughter  Secondary Emergency Contact: Frederic Pratt  Address: [address] [place] [id number] [facility]  Home Phone: [phone]  Relation: Spouse         IMPRESSION:  Closed acute Right comminuted tibial diaphyseal fracture, Right comminuted distal fibula metaphyseal fracture, Right oblique medial malleolar fracture     PLAN:  This patient was discussed with Dr. Degroot, on-call surgeon for [facility] and they are in agreement with the following plan.     -Reviewed xrays and CT scan which show a Right comminuted tibial diaphyseal fracture, Right comminuted distal fibula metaphyseal fracture & Right oblique medial malleolar fracture. CMS intact. Recommend surgical intervention today at 12:30 PM with Dr. Degroot. Patient is very concerned about undergoing general anesthesia as her family members have had a prolonged course with waking up after surgery. However, she understands the need for surgery and agrees to proceed.  -Continue NPO.  -CBC & BMP wnl. INR 1.0.  -Medical optimization per hospitalist.    Thank you for including [facility] in the care of Jaja Pratt. It has been a pleasure participating in Jaja Pratt's care.      CHIEF COMPLAINT: Closed fracture of right tibia and fibula, initial encounter    HISTORY OF PRESENT ILLNESS:  The patient is seen in orthopedic consultation at the request of Dr. Hamilton.  The patient is a 62 year old female with a past medical history of hypertension who presents with right leg pain after slipping on her steps. She had immediate pain after her  fall and couldn't bear weight. Xrays & CT in ER show Right comminuted tibial diaphyseal fracture, Right comminuted distal fibula metaphyseal fracture, Right oblique medial malleolar fractures. She was splinted in anticipation of surgical repair. She denies any numbness or tingling in her right foot and toes. She hasn't eaten anything since midnight. Patient is very concerned about undergoing general anesthesia as her family members have had a prolonged course with waking up after surgery. She has no other concerns.    PAST MEDICAL HISTORY:  Past Medical History:   Diagnosis Date     Benign essential hypertension      ALLERGIES:   Review of patient's allergies indicates No Known Allergies    MEDICATIONS UPON ADMISSION:  Medications were reviewed.  They include:   (Not in a hospital admission)      SOCIAL HISTORY:   she  reports that she has never smoked. She does not have any smokeless tobacco history on file. She reports current alcohol use of about 2.0 standard drinks per week. She reports that she does not use drugs.    FAMILY HISTORY:  family history includes Hypertension in her father and mother.    REVIEW OF SYSTEMS:   Reviewed with patient. See HPI, otherwise negative    PHYSICAL EXAMINATION:  Vitals: Temp:  [97.8  F (36.6  C)-98.4  F (36.9  C)] 98.4  F (36.9  C)  Pulse:  [76-91] 86  Resp:  [12-26] 23  BP: (135-160)/(69-84) 157/78  SpO2:  [94 %-98 %] 96 %  General: On examination, the patient is resting comfortably, NAD, awake and alert and oriented to person, place, time, and and general circumstances, Right lower extremity splint in place  SKIN: Right toes are pink, no rashes or lesions  Pulses:  Right toes warm, cap refill <2 seconds  Sensation: Sensation intact to light touch in all Right toes  Tenderness: TTP over mid anterior tibia, anterior ankle, lateral malleolus  ROM: Moves all toes  Motor: Right ankle & knee not assessed due to fracture  Contralateral side= Full range of motion, Negative joint  instability findings, 5/5 motor groups about the joint, Non-tender.     RADIOGRAPHIC EVALUATION:  Personally reviewed    12/29/22 CT Ankle Right: 1.  Oblique fracture through the anterior aspect of the medial malleolus extending from the anterior medial malleolar surface through the central aspect of the tibial plafond to the posterolateral corner of the tibial plafond. No significant articular   surface offset or displacement of the fracture fragments. No angulation.  2.  Oblique minimally displaced fracture of the distal right fibula as detailed above. No significant widening of the tibiofibular syndesmosis.  3.  Spiral comminuted minimally displaced fracture involving the distal tibial diaphysis.  4.  Subcutaneous edema/hematoma in the right lower leg and ankle as detailed above.        12/29/22 XR Tibia & Fibula: Distal right tibia and fibula are not entirely included in the field-of-study, better demonstrated on right ankle images (please see separate report). Comminuted distal right fibular metaphyseal fracture. Comminuted distal metadiaphyseal   right tibial fracture. Soft tissue swelling and deformity. Mild degenerative changes right knee joint.     12/29/22 XR Ankle Right: Comminuted distal right fibular metaphyseal fracture. Partially visualized comminuted right tibial diaphyseal fracture. Subtle lucency along the medial malleolus, worrisome for an undisplaced fracture. Well-corticated density inferior to the   medial malleolus looks like a remote avulsion fragment. Ankle mortise is symmetric. Talar dome is smooth. Diffuse soft tissue swelling right leg and about the right ankle, more laterally.        PERTINENT LABS:  Lab Results: personally reviewed.   not applicable    Koki Stone PA-C  Date: 12/29/2022  Time: 11:07 AM    CC1:   Navya Souza MD    CC2:   Ellen Skinner

## 2022-12-29 NOTE — ANESTHESIA PREPROCEDURE EVALUATION
Anesthesia Pre-Procedure Evaluation    Patient: Jaja Pratt   MRN: [id number] : 1960        Procedure : Procedure(s):  OPEN REDUCTION INTERNAL FIXATION, FRACTURE, TIBIA, USING INTRAMEDULLARY CORETTA          Past Medical History:   Diagnosis Date     Benign essential hypertension       Past Surgical History:   Procedure Laterality Date     WRIST SURGERY Left       No Known Allergies   Social History     Tobacco Use     Smoking status: Never     Smokeless tobacco: Not on file   Substance Use Topics     Alcohol use: Yes     Alcohol/week: 2.0 standard drinks     Types: 2 Glasses of wine per week      Wt Readings from Last 1 Encounters:   22 77.1 kg (170 lb)        Anesthesia Evaluation            ROS/MED HX  ENT/Pulmonary:  - neg pulmonary ROS  (-) COPD and sleep apnea   Neurologic:  - neg neurologic ROS  (-) no seizures and no CVA   Cardiovascular:     (+) hypertension-----    METS/Exercise Tolerance:     Hematologic:       Musculoskeletal:       GI/Hepatic:  - neg GI/hepatic ROS     Renal/Genitourinary:  - neg Renal ROS     Endo:     (+) Obesity,     Psychiatric/Substance Use:       Infectious Disease:       Malignancy:       Other:            Physical Exam    Airway        Mallampati: II   TM distance: > 3 FB   Neck ROM: full   Mouth opening: > 3 cm    Respiratory Devices and Support         Dental  no notable dental history         Cardiovascular          Rhythm and rate: regular and normal     Pulmonary           breath sounds clear to auscultation           OUTSIDE LABS:  CBC:   Lab Results   Component Value Date    WBC 7.7 2022    HGB 13.7 2022    HCT 41.0 2022     2022     BMP:   Lab Results   Component Value Date     2022    POTASSIUM 3.6 2022    CHLORIDE 108 (H) 2022    CO2 20 (L) 2022    BUN 5 (L) 2022    CR 0.62 2022     2022     COAGS:   Lab Results   Component Value Date    PTT 27 2022    INR 1.00  12/29/2022     POC: No results found for: BGM, HCG, HCGS  HEPATIC: No results found for: ALBUMIN, PROTTOTAL, ALT, AST, GGT, ALKPHOS, BILITOTAL, BILIDIRECT, MARCUS  OTHER:   Lab Results   Component Value Date    MARA 8.9 12/29/2022       Anesthesia Plan    ASA Status:  3   NPO Status:  NPO Appropriate    Anesthesia Type: General.     - Airway: ETT         Techniques and Equipment:       - Drips/Meds: Ketamine     Consents    Anesthesia Plan(s) and associated risks, benefits, and realistic alternatives discussed. Questions answered and patient/representative(s) expressed understanding.    - Discussed:     - Discussed with:  Patient, Spouse      - Extended Intubation/Ventilatory Support Discussed: No.      - Patient is DNR/DNI Status: No    Use of blood products discussed: No .     Postoperative Care       PONV prophylaxis: Ondansetron (or other 5HT-3), Dexamethasone or Solumedrol, Background Propofol Infusion     Comments:    Other Comments: GETA, Ketamine and acetaminophen for adjunct.             Brayden Staton MD

## 2022-12-29 NOTE — ED PROVIDER NOTES
EMERGENCY DEPARTMENT ENCOUNTER      NAME: Jaja Pratt  AGE: 62 year old female  YOB: 1960  MRN: [id number]  EVALUATION DATE & TIME: 2022  2:01 AM    PCP: No primary care provider on file.    ED PROVIDER: Ashok Adames M.D.      Chief Complaint   Patient presents with     Fall     Ankle Pain         FINAL IMPRESSION:  1.  Acute fall.  2.  Acute right ankle sprain.  3.  Acute right ankle lateral malleolus fibular fracture and midshaft fibula fracture.    ED COURSE & MEDICAL DECISION MAKIN:16 AM I met with the patient to gather history and to perform my initial exam. We discussed plans for the ED course, including diagnostic testing and treatment. PPE worn: cloth mask.  Patient tripped the last 2 steps at home and turned her right ankle.  She now has some swelling and pain in the lateral ankle.  Difficulty standing or walking afterwards.  Had not taken anything for the pain.  No other complaints.  Does not take blood thinners.  No other complaints or injury.  4:50 AM.  X-ray showing a midshaft tibia fracture and a distal fibula fracture.  Questionable fracture also at the medial malleolus.  CT ordered.  Admission EKG and lab work ordered.  Will page admitting service and orthopedics.  A long-leg posterior fiberglass splint was placed by myself in addition a short leg sugar-tong splints were placed.  Patient tolerated the procedure well.  5 AM.  Spoke with the family practice service and orthopedics who agree with patient admission to [facility].  Ortho notes that they will put her on the surgery schedule.    Pertinent Labs & Imaging studies reviewed. (See chart for details)  62 year old female presents to the Emergency Department for evaluation of right ankle pain.    At the conclusion of the encounter I discussed the results of all of the tests and the disposition. The questions were answered. The patient or family acknowledged understanding and was agreeable with the care plan.      Medical Decision Making    History:    Supplemental history from: Documented in \[facility] [place] [facility]\"", if applicable    External Record(s) reviewed: Inpatient computer records reviewed.    Work Up:    Chart documentation includes differential considered and any EKGs or imaging independently interpreted by provider.  Differential diagnosis includes ankle contusion, ankle sprain, ankle fracture, etc.  In additional to work up documented, I considered the following work up: See chart documentation, if applicable.    External consultation:    Discussion of management with another provider: See chart documentation, if applicable    Complicating factors:    Care impacted by chronic illness: N/A    Care affected by social determinants of health: N/A    Disposition considerations: Anticipate discharge home.    MEDICATIONS GIVEN IN THE EMERGENCY:  Medications - No data to display    NEW PRESCRIPTIONS STARTED AT TODAY'S ER VISIT  New Prescriptions    No medications on file          =================================================================    \[facility] [place] [facility]\""    Patient information was obtained from: Patient    Use of : N/A        Jaja Pratt is a 62 year old female with a pertinent history of hypertension, obesity who presents to this ED by EMS for evaluation of fall, ankle pain. Patient reports she accidentally rolled her right ankle earlier today and tripped down a couple stairs. Patient currently endorses right lateral ankle pain, but denies any other pain complaints or injuries. She is non ambulatory secondary to pain. No other reported complaints at this time.    Does not identify any waxing or waning symptoms otherwise, exacerbating or alleviating features,associated symptoms except as mentioned.    REVIEW OF SYSTEMS   Review of Systems   Musculoskeletal: Positive for arthralgias (right ankle pain) and gait problem (non ambulatory d/t pain).   All other systems reviewed and are negative.       PAST MEDICAL HISTORY:  No past  "medical history on file.    PAST SURGICAL HISTORY:  No past surgical history on file.        CURRENT MEDICATIONS:    No current outpatient medications on file.      ALLERGIES:  No Known Allergies    FAMILY HISTORY:  No family history on file.    SOCIAL HISTORY:   Social History     Socioeconomic History     Marital status:        VITALS:  BP (!) 149/69   Pulse 79   Temp 97.8  F (36.6  C) (Oral)   Resp 26   Ht 1.575 m (5' 2\")   SpO2 98%     PHYSICAL EXAM    Vital Signs:  BP (!) 149/69   Pulse 79   Temp 97.8  F (36.6  C) (Oral)   Resp 26   Ht 1.575 m (5' 2\")   SpO2 98%   General:  On entering the room she is in no apparent distress.    Neck:  Neck supple with full range of motion and nontender.    Back:  Back and spine are nontender.  No costovertebral angle tenderness.    HEENT:  Oropharynx clear with moist mucous membranes.  HEENT unremarkable.    Pulmonary:  Chest clear to auscultation without rhonchi rales or wheezing.    Cardiovascular:  Cardiac regular rate and rhythm without murmurs rubs or gallops.    Abdomen:  Abdomen soft nontender.  There is no rebound or guarding.    Muskuloskeletal:  she moves all 4 without any difficulty and has normal neurovascular exams.  Extremities without clubbing, cyanosis, or edema.  Legs and calves are nontender.  Full range of motion of the right ankle.  Good pulse cap refill.  Sensation intact soft touch.  There is swelling in and around the lateral malleolus and an inch or 2 proximal to that area.  Neuro:  she is alert and oriented ×3 and moves all extremities symmetrically.    Psych:  Normal affect.    Skin:  Unremarkable and warm and dry.       LAB:  All pertinent labs reviewed and interpreted.  Labs Ordered and Resulted from Time of ED Arrival to Time of ED Departure - No data to display    RADIOLOGY:  Reviewed all pertinent imaging. Please see official radiology report.  Ankle XR, G/E 3 views, right   Final Result   IMPRESSION: Comminuted distal right " fibular metaphyseal fracture. Partially visualized comminuted right tibial diaphyseal fracture. Subtle lucency along the medial malleolus, worrisome for an undisplaced fracture. Well-corticated density inferior to the    medial malleolus looks like a remote avulsion fragment. Ankle mortise is symmetric. Talar dome is smooth. Diffuse soft tissue swelling right leg and about the right ankle, more laterally.                       XR Tibia and Fibula Right 2 Views   Final Result   IMPRESSION: Distal right tibia and fibula are not entirely included in the field-of-study, better demonstrated on right ankle images (please see separate report). Comminuted distal right fibular metaphyseal fracture. Comminuted distal metadiaphyseal    right tibial fracture. Soft tissue swelling and deformity. Mild degenerative changes right knee joint.                             CT Ankle Right w/o Contrast    (Results Pending)              EKG:    No previous EKG for comparison.  EKG shows normal sinus rhythm at 87, nonspecific ST segment changes.  Otherwise normal EKG.      PROCEDURES:         I, Murali Joy, am serving as a scribe to document services personally performed by Dr. Adames based on my observation and the provider's statements to me. I, Ashok Adames MD attest that Murali Joy is acting in a scribe capacity, has observed my performance of the services and has documented them in accordance with my direction.    Ashok Adames M.D.  Emergency Medicine  [facility] EMERGENCY ROOM  [address] [address]  [phone]  Dept: [phone]     Ashok Adames MD  12/29/22 0451       Ashok Adames MD  12/29/22 5278       Ashok Adames MD  12/29/22 1220

## 2022-12-29 NOTE — H&P (VIEW-ONLY)
ORTHOPEDIC CONSULTATION    Consultation  Jaja Pratt,  1960, MRN [id number]    [unfilled]  Closed fracture of right tibia and fibula, initial encounter [V82.201A, S82.401A]    PCP: Ellen Skinner, [phone]   Code status:  Full Code       Extended Emergency Contact Information  Primary Emergency Contact: Inés Islas  Mobile Phone: [phone]  Relation: Daughter  Secondary Emergency Contact: Frederic Pratt  Address: [address] [place] [address] [facility]  Home Phone: [phone]  Relation: Spouse         IMPRESSION:  Closed acute Right comminuted tibial diaphyseal fracture, Right comminuted distal fibula metaphyseal fracture, Right oblique medial malleolar fracture     PLAN:  This patient was discussed with Dr. Degroot, on-call surgeon for [facility] and they are in agreement with the following plan.     -Reviewed xrays and CT scan which show a Right comminuted tibial diaphyseal fracture, Right comminuted distal fibula metaphyseal fracture & Right oblique medial malleolar fracture. CMS intact. Recommend surgical intervention today at 12:30 PM with Dr. Degroot. Patient is very concerned about undergoing general anesthesia as her family members have had a prolonged course with waking up after surgery. However, she understands the need for surgery and agrees to proceed.  -Continue NPO.  -CBC & BMP wnl. INR 1.0.  -Medical optimization per hospitalist.    Thank you for including [facility] in the care of Jaja Pratt. It has been a pleasure participating in Jaja Pratt's care.      CHIEF COMPLAINT: Closed fracture of right tibia and fibula, initial encounter    HISTORY OF PRESENT ILLNESS:  The patient is seen in orthopedic consultation at the request of Dr. Hamilton.  The patient is a 62 year old female with a past medical history of hypertension who presents with right leg pain after slipping on her steps. She had immediate pain after her  fall and couldn't bear weight. Xrays & CT in ER show Right comminuted tibial diaphyseal fracture, Right comminuted distal fibula metaphyseal fracture, Right oblique medial malleolar fractures. She was splinted in anticipation of surgical repair. She denies any numbness or tingling in her right foot and toes. She hasn't eaten anything since midnight. Patient is very concerned about undergoing general anesthesia as her family members have had a prolonged course with waking up after surgery. She has no other concerns.    PAST MEDICAL HISTORY:  Past Medical History:   Diagnosis Date     Benign essential hypertension      ALLERGIES:   Review of patient's allergies indicates No Known Allergies    MEDICATIONS UPON ADMISSION:  Medications were reviewed.  They include:   (Not in a hospital admission)      SOCIAL HISTORY:   she  reports that she has never smoked. She does not have any smokeless tobacco history on file. She reports current alcohol use of about 2.0 standard drinks per week. She reports that she does not use drugs.    FAMILY HISTORY:  family history includes Hypertension in her father and mother.    REVIEW OF SYSTEMS:   Reviewed with patient. See HPI, otherwise negative    PHYSICAL EXAMINATION:  Vitals: Temp:  [97.8  F (36.6  C)-98.4  F (36.9  C)] 98.4  F (36.9  C)  Pulse:  [76-91] 86  Resp:  [12-26] 23  BP: (135-160)/(69-84) 157/78  SpO2:  [94 %-98 %] 96 %  General: On examination, the patient is resting comfortably, NAD, awake and alert and oriented to person, place, time, and and general circumstances, Right lower extremity splint in place  SKIN: Right toes are pink, no rashes or lesions  Pulses:  Right toes warm, cap refill <2 seconds  Sensation: Sensation intact to light touch in all Right toes  Tenderness: TTP over mid anterior tibia, anterior ankle, lateral malleolus  ROM: Moves all toes  Motor: Right ankle & knee not assessed due to fracture  Contralateral side= Full range of motion, Negative joint  instability findings, 5/5 motor groups about the joint, Non-tender.     RADIOGRAPHIC EVALUATION:  Personally reviewed    12/29/22 CT Ankle Right: 1.  Oblique fracture through the anterior aspect of the medial malleolus extending from the anterior medial malleolar surface through the central aspect of the tibial plafond to the posterolateral corner of the tibial plafond. No significant articular   surface offset or displacement of the fracture fragments. No angulation.  2.  Oblique minimally displaced fracture of the distal right fibula as detailed above. No significant widening of the tibiofibular syndesmosis.  3.  Spiral comminuted minimally displaced fracture involving the distal tibial diaphysis.  4.  Subcutaneous edema/hematoma in the right lower leg and ankle as detailed above.        12/29/22 XR Tibia & Fibula: Distal right tibia and fibula are not entirely included in the field-of-study, better demonstrated on right ankle images (please see separate report). Comminuted distal right fibular metaphyseal fracture. Comminuted distal metadiaphyseal   right tibial fracture. Soft tissue swelling and deformity. Mild degenerative changes right knee joint.     12/29/22 XR Ankle Right: Comminuted distal right fibular metaphyseal fracture. Partially visualized comminuted right tibial diaphyseal fracture. Subtle lucency along the medial malleolus, worrisome for an undisplaced fracture. Well-corticated density inferior to the   medial malleolus looks like a remote avulsion fragment. Ankle mortise is symmetric. Talar dome is smooth. Diffuse soft tissue swelling right leg and about the right ankle, more laterally.        PERTINENT LABS:  Lab Results: personally reviewed.   not applicable    Koki Stone PA-C  Date: 12/29/2022  Time: 11:07 AM    CC1:   Navya Souza MD    CC2:   Ellen Skinner

## 2022-12-29 NOTE — H&P (VIEW-ONLY)
EMERGENCY DEPARTMENT ENCOUNTER      NAME: Jaja Pratt  AGE: 62 year old female  YOB: 1960  MRN: [id number]  EVALUATION DATE & TIME: 2022  2:01 AM    PCP: No primary care provider on file.    ED PROVIDER: Ashok Adames M.D.      Chief Complaint   Patient presents with     Fall     Ankle Pain         FINAL IMPRESSION:  1.  Acute fall.  2.  Acute right ankle sprain.  3.  Acute right ankle lateral malleolus fibular fracture and midshaft fibula fracture.    ED COURSE & MEDICAL DECISION MAKIN:16 AM I met with the patient to gather history and to perform my initial exam. We discussed plans for the ED course, including diagnostic testing and treatment. PPE worn: cloth mask.  Patient tripped the last 2 steps at home and turned her right ankle.  She now has some swelling and pain in the lateral ankle.  Difficulty standing or walking afterwards.  Had not taken anything for the pain.  No other complaints.  Does not take blood thinners.  No other complaints or injury.  4:50 AM.  X-ray showing a midshaft tibia fracture and a distal fibula fracture.  Questionable fracture also at the medial malleolus.  CT ordered.  Admission EKG and lab work ordered.  Will page admitting service and orthopedics.  A long-leg posterior fiberglass splint was placed by myself in addition a short leg sugar-tong splints were placed.  Patient tolerated the procedure well.  5 AM.  Spoke with the family practice service and orthopedics who agree with patient admission to [facility].  Ortho notes that they will put her on the surgery schedule.    Pertinent Labs & Imaging studies reviewed. (See chart for details)  62 year old female presents to the Emergency Department for evaluation of right ankle pain.    At the conclusion of the encounter I discussed the results of all of the tests and the disposition. The questions were answered. The patient or family acknowledged understanding and was agreeable with the care plan.      Medical Decision Making    History:    Supplemental history from: Documented in [facility], if applicable    External Record(s) reviewed: Inpatient computer records reviewed.    Work Up:    Chart documentation includes differential considered and any EKGs or imaging independently interpreted by provider.  Differential diagnosis includes ankle contusion, ankle sprain, ankle fracture, etc.  In additional to work up documented, I considered the following work up: See chart documentation, if applicable.    External consultation:    Discussion of management with another provider: See chart documentation, if applicable    Complicating factors:    Care impacted by chronic illness: N/A    Care affected by social determinants of health: N/A    Disposition considerations: Anticipate discharge home.    MEDICATIONS GIVEN IN THE EMERGENCY:  Medications - No data to display    NEW PRESCRIPTIONS STARTED AT TODAY'S ER VISIT  New Prescriptions    No medications on file          =================================================================    [facility]    Patient information was obtained from: Patient    Use of : N/A        Jaja Pratt is a 62 year old female with a pertinent history of hypertension, obesity who presents to this ED by EMS for evaluation of fall, ankle pain. Patient reports she accidentally rolled her right ankle earlier today and tripped down a couple stairs. Patient currently endorses right lateral ankle pain, but denies any other pain complaints or injuries. She is non ambulatory secondary to pain. No other reported complaints at this time.    Does not identify any waxing or waning symptoms otherwise, exacerbating or alleviating features,associated symptoms except as mentioned.    REVIEW OF SYSTEMS   Review of Systems   Musculoskeletal: Positive for arthralgias (right ankle pain) and gait problem (non ambulatory d/t pain).   All other systems reviewed and are negative.       PAST MEDICAL HISTORY:  No past  "medical history on file.    PAST SURGICAL HISTORY:  No past surgical history on file.        CURRENT MEDICATIONS:    No current outpatient medications on file.      ALLERGIES:  No Known Allergies    FAMILY HISTORY:  No family history on file.    SOCIAL HISTORY:   Social History     Socioeconomic History     Marital status:        VITALS:  BP (!) 149/69   Pulse 79   Temp 97.8  F (36.6  C) (Oral)   Resp 26   Ht 1.575 m (5' 2\")   SpO2 98%     PHYSICAL EXAM    Vital Signs:  BP (!) 149/69   Pulse 79   Temp 97.8  F (36.6  C) (Oral)   Resp 26   Ht 1.575 m (5' 2\")   SpO2 98%   General:  On entering the room she is in no apparent distress.    Neck:  Neck supple with full range of motion and nontender.    Back:  Back and spine are nontender.  No costovertebral angle tenderness.    HEENT:  Oropharynx clear with moist mucous membranes.  HEENT unremarkable.    Pulmonary:  Chest clear to auscultation without rhonchi rales or wheezing.    Cardiovascular:  Cardiac regular rate and rhythm without murmurs rubs or gallops.    Abdomen:  Abdomen soft nontender.  There is no rebound or guarding.    Muskuloskeletal:  she moves all 4 without any difficulty and has normal neurovascular exams.  Extremities without clubbing, cyanosis, or edema.  Legs and calves are nontender.  Full range of motion of the right ankle.  Good pulse cap refill.  Sensation intact soft touch.  There is swelling in and around the lateral malleolus and an inch or 2 proximal to that area.  Neuro:  she is alert and oriented ×3 and moves all extremities symmetrically.    Psych:  Normal affect.    Skin:  Unremarkable and warm and dry.       LAB:  All pertinent labs reviewed and interpreted.  Labs Ordered and Resulted from Time of ED Arrival to Time of ED Departure - No data to display    RADIOLOGY:  Reviewed all pertinent imaging. Please see official radiology report.  Ankle XR, G/E 3 views, right   Final Result   IMPRESSION: Comminuted distal right " fibular metaphyseal fracture. Partially visualized comminuted right tibial diaphyseal fracture. Subtle lucency along the medial malleolus, worrisome for an undisplaced fracture. Well-corticated density inferior to the    medial malleolus looks like a remote avulsion fragment. Ankle mortise is symmetric. Talar dome is smooth. Diffuse soft tissue swelling right leg and about the right ankle, more laterally.                       XR Tibia and Fibula Right 2 Views   Final Result   IMPRESSION: Distal right tibia and fibula are not entirely included in the field-of-study, better demonstrated on right ankle images (please see separate report). Comminuted distal right fibular metaphyseal fracture. Comminuted distal metadiaphyseal    right tibial fracture. Soft tissue swelling and deformity. Mild degenerative changes right knee joint.                             CT Ankle Right w/o Contrast    (Results Pending)              EKG:    No previous EKG for comparison.  EKG shows normal sinus rhythm at 87, nonspecific ST segment changes.  Otherwise normal EKG.      PROCEDURES:         I, Murali Joy, am serving as a scribe to document services personally performed by Dr. Adames based on my observation and the provider's statements to me. I, Ashok Adames MD attest that Murali Joy is acting in a scribe capacity, has observed my performance of the services and has documented them in accordance with my direction.    Ashok Adames M.D.  Emergency Medicine  [facility] EMERGENCY ROOM  [address] [facility] [address]  [phone]  Dept: [phone]     Ashok Adames MD  12/29/22 0454       Ashok Adames MD  12/29/22 8486       Ashok Adames MD  12/29/22 3770

## 2022-12-29 NOTE — PLAN OF CARE
Full consult pending for this AM  Patient with right tib/ fib fracture after fall down 2 stairs  Splinted in ER   Will require surgery with tibial nail.    Plan:  Anticipate surgery today  NPO  NWB/ bed rest pending surgery  Appreciate medical care

## 2022-12-29 NOTE — ANESTHESIA CARE TRANSFER NOTE
Patient: Jaja Pratt    Procedure: Procedure(s):  OPEN VERSUS CLOSED REDUCTION INTERNAL FIXATION, FRACTURE, RIGHT TIBIA, USING INTRAMEDULLARY NAIL  OPEN REDUCTION INTERNAL FIXATION, FRACTURE, RIGHT MEDIAL MALLEOLUS, OPEN REDUCTION INTERNAL FIXATION RIGHT FIBULA       Diagnosis: Fall, initial encounter [W19.XXXA]  Closed fracture of right tibia and fibula, initial encounter [S82.201A, S82.401A]  Diagnosis Additional Information: No value filed.    Anesthesia Type:   General     Note:    Oropharynx: oropharynx clear of all foreign objects  Level of Consciousness: awake  Oxygen Supplementation: face mask  Level of Supplemental Oxygen (L/min / FiO2): 6    Dentition: dentition unchanged  Vital Signs Stable: post-procedure vital signs reviewed and stable  Report to RN Given: handoff report given  Patient transferred to: PACU    Handoff Report: Identifed the Patient, Identified the Reponsible Provider, Reviewed the pertinent medical history, Discussed the surgical course, Reviewed Intra-OP anesthesia mangement and issues during anesthesia, Set expectations for post-procedure period and Allowed opportunity for questions and acknowledgement of understanding      Vitals:  Vitals Value Taken Time   /79 12/29/22 1753   Temp 37.2  C (98.9  F) 12/29/22 1753   Pulse 96 12/29/22 1757   Resp 16 12/29/22 1757   SpO2 96 % 12/29/22 1757   Vitals shown include unvalidated device data.    Electronically Signed By: EMERITA Shukla CRNA  December 29, 2022  5:58 PM

## 2022-12-29 NOTE — PROGRESS NOTES
[facility]    Progress Note - Hospitalist Service       Date of Admission:  12/29/2022    Assessment & Plan   Jaja Pratt is a 62 year old female admitted on 12/29/2022. She has a history of HTN, obesity and is admitted for R tibial-fibular and medial malleolar fractures. Presented to ED after mechanical fall with inability to ambulate; no head injury or LOC. CT and x-ray showing R midshaft tibial, distal fibular, and medial malleolus fracture. Pre-op labs unremarkable, EKG with NSR. Ortho consulted and plan for surgery today at 1230.    Ankle injury  Fibular fracture  Tibial fracture  Pain controlled; awaiting surgery.   -orthopedics consulted, appreciate recs  -NPO  -oxycodone 5 mg q3 hours PRN  -scheduled tylenol  -bedrest  -mIVF  -Postop check after surgery    HTN  -PTA amlodipine  -PRN hydralazine     Diet: NPO per Anesthesia Guidelines for Procedure/Surgery Except for: Meds    DVT Prophylaxis: mechanical - pending Surgery recs postop  Dorman Catheter: Not present  Fluids:  ml/hr  Central Lines: None  Cardiac Monitoring: None  Code Status: Full Code      Disposition Plan      Expected Discharge Date: 12/31/2022        Discharge Comments: surgery 12/29, pending postop recovery        The patient's care was discussed with the attending physician, Dr. Souza.    Anu Fair MD PGY-1  [facility] Family Medicine Resident Team  [facility]  Securely message with the Vocera Web Console (learn more here)  Text page via [employer] Paging/Directory     ______________________________________________________________________    Interval History   No acute events overnight.  Denies headache, chest pain, dyspnea, and abdominal pain.  Has some nausea because she is hungry.  Pain controlled.    Data reviewed today: I reviewed all medications, new labs and imaging results over the last 24 hours.    Physical Exam   Vital Signs: Temp: 98.4  F (36.9  C) Temp src: Oral BP: (!)  148/76 Pulse: 86   Resp: 16 SpO2: 97 % O2 Device: None (Room air)    Weight: 170 lbs 0 oz  General Appearance: Alert, lying in bed, no acute distress  Respiratory: Lungs clear to auscultation bilaterally  Cardiovascular: Regular rate and rhythm, normal S1 and S2, no murmurs, no lower extremity edema to left leg  GI: Soft, bowel sounds present, nontender, nondistended  Extremities: Right leg wrapped and splinted    Data   Recent Labs   Lab 12/29/22  1257 12/29/22  0500   WBC  --  7.7   HGB  --  13.7   MCV  --  93   PLT  --  337   INR  --  1.00   NA  --  140   POTASSIUM  --  3.6   CHLORIDE  --  108*   CO2  --  20*   BUN  --  5*   CR  --  0.62   ANIONGAP  --  12   MARA  --  8.9   * 114     Recent Results (from the past 24 hour(s))   XR Tibia and Fibula Right 2 Views    Narrative    EXAM: XR TIBIA AND FIBULA RIGHT 2 VIEWS  LOCATION: [facility]  DATE/TIME: 12/29/2022 4:00 AM    INDICATION: Fall, trauma.  COMPARISON: X-ray right ankle 3 views 12/29/2022 at 0358 hours.      Impression    IMPRESSION: Distal right tibia and fibula are not entirely included in the field-of-study, better demonstrated on right ankle images (please see separate report). Comminuted distal right fibular metaphyseal fracture. Comminuted distal metadiaphyseal   right tibial fracture. Soft tissue swelling and deformity. Mild degenerative changes right knee joint.                     Ankle XR, G/E 3 views, right    Narrative    EXAM: XR ANKLE RIGHT G/E 3 VIEWS  LOCATION: [facility]  DATE/TIME: 12/29/2022 4:01 AM    INDICATION: Right ankle pain after fall.  COMPARISON: X-ray right tibia and fibula 2 views 12/29/2022 at 0352 hours.      Impression    IMPRESSION: Comminuted distal right fibular metaphyseal fracture. Partially visualized comminuted right tibial diaphyseal fracture. Subtle lucency along the medial malleolus, worrisome for an undisplaced fracture. Well-corticated density inferior to  the   medial malleolus looks like a remote avulsion fragment. Ankle mortise is symmetric. Talar dome is smooth. Diffuse soft tissue swelling right leg and about the right ankle, more laterally.                CT Ankle Right w/o Contrast    Narrative    EXAM: CT ANKLE RIGHT W/O CONTRAST  LOCATION: [facility]  DATE/TIME: 12/29/2022 5:23 AM    INDICATION: Comminuted fracture distal right fibular metaphysis as well as distal right tibial diaphyseal fracture. Possible subtle lucency along the medial malleolus. Evaluate for medial malleolar fracture.  COMPARISON: Right ankle radiograph 12/29/2022.  TECHNIQUE: Noncontrast. Axial, sagittal and coronal thin-section reconstruction. Dose reduction techniques were used.     FINDINGS:     BONES:  -Oblique fracture through the anterior aspect of the medial malleolus extending from the anterior medial aspect of the medial malleolus through the central aspect of the tibial plafond to the posterior lateral corner of the tibial plafond (series 5,   images 87-90). No significant displacement or angulation of this oblique fracture.    -Spiral comminuted distal right tibial diaphyseal fracture. Minimal anterior displacement of the cortex relative to the posterior cortex by 7 mm (5-39). No other tibial fractures.    -Comminuted oblique minimally displaced fracture posterior distal left fibula with minimal posterior displacement of the distal fracture fragment by 3 mm. No evidence for widening of the ankle mortise or tibiofibular syndesmosis.    -Talus, calcaneus and navicular as well as the visualized aspects of the cuneiforms, cuboid and metatarsals unremarkable.    SOFT TISSUES:  -High density subcutaneous fluid in the anterior aspect of the right lower leg at the spiral tibial diaphyseal fracture site compatible with subcutaneous hematoma.    -Minimal subcutaneous edema or hematoma adjacent to the fibular fracture.    -No muscular or tendinous abnormality.    "   Impression    IMPRESSION:  1.  Oblique fracture through the anterior aspect of the medial malleolus extending from the anterior medial malleolar surface through the central aspect of the tibial plafond to the posterolateral corner of the tibial plafond. No significant articular   surface offset or displacement of the fracture fragments. No angulation.    2.  Oblique minimally displaced fracture of the distal right fibula as detailed above. No significant widening of the tibiofibular syndesmosis.    3.  Spiral comminuted minimally displaced fracture involving the distal tibial diaphysis.    4.  Subcutaneous edema/hematoma in the right lower leg and ankle as detailed above.                 POC US Guidance Needle Placement    Narrative    Ultrasound was performed as guidance to an anesthesia procedure.  Click   \"PACS images\" hyperlink below to view any stored images.  For specific   procedure details, view procedure note authored by anesthesia.   XR Surgery GIANLUCA  Fluoro G/T 5 Min    Narrative    This exam was marked as non-reportable because it will not be read by a   radiologist or a [place] non-radiologist provider.           Medications     lactated ringers       sodium chloride 100 mL/hr at 12/29/22 0734       [Auto Hold] acetaminophen  975 mg Oral Q8H     [Auto Hold] amLODIPine  10 mg Oral Daily     ceFAZolin  2 g Intravenous See Admin Instructions     [Auto Hold] ondansetron  4 mg Intravenous Once     [Auto Hold] sodium chloride (PF)  3 mL Intracatheter Q8H     sodium chloride (PF)  3 mL Intracatheter Q8H     "

## 2022-12-29 NOTE — BRIEF OP NOTE
[facility]    Brief Operative Note    Pre-operative diagnosis:  1. Right closed, comminuted distal 1/3 tibial shaft fracture   2. Right closed non-displaced medial malleolus fracture with intraarticular extension   3. Right closed comminuted distal fibula fracture    Post-operative diagnosis   1. Right closed, comminuted distal 1/3 tibial shaft fracture   2. Right closed non-displaced medial malleolus fracture with intraarticular extension   3. Right closed comminuted distal fibula fracture      Procedure:  1. Right tibia percutaneous reduction and insertion of intramedullary nail  2. Right medial malleolus open reduction internal fixation  3. Right distal fibula open reduction internal fixation.     Surgeon:  Anjel Degroot MD     Assistant:   Clementina Grimes PA-C    Anesthesia: General + local  Estimated Blood Loss: 300 mL      Drains: None  Specimens: * No specimens in log *  Findings:   Closed displaced, comminuted distal 1/3 tibial shaft/metadiaphyseal fracture, with associated vertical intraarticular medial malleolus fracture and displaced comminuted distal fibula fracture. Mild-moderate swelling to RLE amendable for surgery. Mild duskiness of skin overlying fracture without skin compromise. Reduction of tibial shaft fracture achieved with manual manipulation of the RLE along with percutaneous clamping x 2. Given intraarticular extension of vertical medial malleolus fracture, was felt to best be addressed with buttress plating in addition to lag screw placement distally just above the subchondral bone. External rotation stress view after medial malleolus ORIF demonstrated persistent mild mortise widening, and therefore distal fibula ORIF was performed. Compartments remained soft throughout the entirety of the procedure. Distal pulses were strong and palpable with toes warm and well perfused.     Complications: None.  Implants:   Implant Name Type Inv. Item Serial No.   Lot No. LRB No. Used Action   Tibial nail advanced 10mm    SYNTHES [id number] Right 1 Implanted   SCREW BN 36MM 5MM LCK X25 IM NL 04.045.036S - [id number] Metallic Hardware/[place] SCREW BN 36MM 5MM LCK X25 IM NL 04.045.036S  ACS [employer]othing[employer] [id number] Right 1 Implanted   LOCKING SCREW FOR IM NAIL     SYNTHES [id number] Right 1 Implanted   SCREW BN 40MM 5MM LCK X25 STRL IM NL 04.045.040S - [id number] Metallic Hardware/[place] SCREW BN 40MM 5MM LCK X25 STRL IM NL 04.045.040S  ACS [employer] [id number] Right 1 Implanted   SCREW BN 32MM 5MM LCK X25 STRL IM NL 04.045.032S - [id number] Metallic Hardware/[place] SCREW BN 32MM 5MM LCK X25 STRL IM NL 04.045.032S  [employer] [id number] Right 1 Implanted   SCREW BN 42MM 5MM LCK X25 STRL IM NL 04.045.042S - [id number] Metallic Hardware/[place] SCREW BN 42MM 5MM LCK X25 STRL IM NL 04.045.042S  [employer] [id number] Right 1 Implanted   IMP SCR SYN CORTEX 2.7X16MM SELF TAP .816 - [id number] Metallic Hardware/[place] IMP SCR SYN CORTEX 2.7X16MM SELF TAP .816  SYNTHES-STRATEC NA Right 2 Implanted   IMP SCR SYN 3.5X14MM LOCKING W/STARDRIVE .103 - [id number] Metallic Hardware/[place] IMP SCR SYN 3.5X14MM LOCKING W/STARDRIVE .103  SYNTHES-STRATEC NA Right 1 Implanted   IMP SCR SYN 3.5X16MM LOCKING W/STARDRIVE .104 - [id number] Metallic Hardware/[place] IMP SCR SYN 3.5X16MM LOCKING W/STARDRIVE .104  SYNTHES-STRATEC NA Right 2 Implanted   IMP SCR SYN CORTEX 3.5X14MM SELF TAP .814 - [id number] Metallic Hardware/[place] IMP SCR SYN CORTEX 3.5X14MM SELF TAP .814  SYNTHES-STRATEC NA Right 3 Implanted   IMP SCR SYN CORTEX 3.5X16MM SELF TAP .816 - [id number] Metallic Hardware/[place] IMP SCR SYN CORTEX 3.5X16MM SELF TAP .816  SYNTHES-STRATEC NA Right 2 Implanted   IMP SCR SYN CORTEX 3.5X30MM SELF TAP .830 - [id number] Metallic Hardware/[place] IMP SCR SYN CORTEX 3.5X30MM SELF TAP .830  SYNTHES-STRATEC NA Right 1 Implanted    IMP SCR SYN CORTEX 3.5X32MM SELF TAP .832 - [id number] Metallic Hardware/[place] IMP SCR SYN CORTEX 3.5X32MM SELF TAP .832  SYNTHES-STRATEC NA Right 1 Implanted   IMP SCR SYN CORTEX 3.5X34MM SELF TAP .834 - [id number] Metallic Hardware/[place] IMP SCR SYN CORTEX 3.5X34MM SELF TAP .834  SYNTHES-STRATEC NA Right 1 Implanted   IMP SCR SYN CORTEX 3.5X36MM SELF TAP .836 - [id number] Metallic Hardware/[place] IMP SCR SYN CORTEX 3.5X36MM SELF TAP .836  SYNTHES-STRATEC NA Right 1 Implanted   IMP SCR SYN CORTEX 3.5X38MM SELF TAP .838 - [id number] Metallic Hardware/[place] IMP SCR SYN CORTEX 3.5X38MM SELF TAP .838  SYNTHES-STRATEC NA Right 1 Implanted   IMP PLATE SYN 1/3 TUBULAR 57MM 05H .351 - [id number] Metallic Hardware/[place] IMP PLATE SYN 1/3 TUBULAR 57MM 05H .351  SYNTHES-STRATEC NA Right 1 Implanted   IMP SCR SYN CAN 4.0X40MM FT .040 - [id number] Metallic Hardware/[place] IMP SCR SYN CAN 4.0X40MM FT .040  SYNTHES-STRATEC NA Right 2 Implanted   IMP SCR SYN CAN 4.0X35MM FT .035 - [id number] Metallic Hardware/[place] IMP SCR SYN CAN 4.0X35MM FT .035  SYNTHES-STRATEC NA Right 1 Wasted   IMP SCR SYN CAN 4.0X45MM FT .045 - [id number] Metallic Hardware/[place] IMP SCR SYN CAN 4.0X45MM FT .045  SYNTHES-STRATEC NA Right 1 Wasted       Activity: Up with assist and assistive device (walker) until independent.  Weight bearing status: NWB RLE x 6 weeks.  Pain management: Transition from IV to PO as tolerated.    Antibiotics: Ancef x 24 hours.  Diet: Begin with clear fluids and progress diet as tolerated.   DVT prophylaxis: ASA 81 mg BID and mechanical while in the hospital, discharge on ASA 81 mg BID x 6 weeks.  Imaging: PACU.  Labs: Hgb POD#1.  Bracing/Splinting: keep bulky flannery splint in place and c/d/i until follow up  Elevation: Elevate RLE on pillows to keep above the level of the heart as much as possible.   Physical  Therapy/Occupational Therapy: Eval and treat.  Consults: PT/OT.  Follow-up: Clinic in 2 weeks with repeat x-rays needed.   Disposition: Pending progress with therapies, pain control on orals, and medical stability, discharge to home vs TCU TBD    Anjel Degroot MD  Orthopedic Surgery

## 2022-12-29 NOTE — INTERVAL H&P NOTE
"I have reviewed the surgical (or preoperative) H&P that is linked to this encounter, and examined the patient. There are no significant changes    Clinical Conditions Present on Arrival:  Clinically Significant Risk Factors Present on Admission                    # Obesity: Estimated body mass index is 31.09 kg/m  as calculated from the following:    Height as of this encounter: 1.575 m (5' 2\").    Weight as of this encounter: 77.1 kg (170 lb).       "

## 2022-12-29 NOTE — INTERVAL H&P NOTE
I have independently reviewed the patient's chart including pertinent notes, labs, and imaging findings.  I have independently examined her in the preoperative area.  I agree with the above assessment and plan.    Jaja is a very pleasant 62-year-old female with a past medical history notable for hypertension who unfortunately overnight last night had a mechanical fall in which she stumbled catching her feet on her pajama bottoms, causing her to awkwardly twist the right lower extremity and fall.  She had immediate onset of pain within the right ankle and lower extremity.  She was brought to the emergency department where she was noted to have a displaced comminuted distal third tibial shaft fracture extending into the metadiaphysis.  Additionally she was noted to have a nondisplaced intraarticular vertical/oblique fracture of the medial malleolus in addition to a comminuted distal fibula metaphyseal fracture.  This is a closed injury.  On examination today she is neurovascularly intact in splint.  I discussed with her and her  the nature of her tibia and fibular fractures.  We did review her x-rays demonstrating the displaced distal third tibial shaft/metadiaphysis fracture in addition to the medial malleoli fracture and distal fibula fracture.  We discussed treatment options both nonoperative as well as operative.  The pros and cons to both were reviewed.  Given the unstable nature of her tibial shaft/metadiaphysis fracture in addition to the associated medial malleolus and distal fibular fracture, we did discuss my recommendation for surgical management to better improve her alignment and allow for stability while bone healing takes place.  I discussed the nature of surgery, including the associated risks, benefits, alternatives, limitations, anticipated postoperative recovery and timeline.  Risks including but not limited to complications with anesthesia, infection, wound complications, bleeding, damage  to surrounding structures, DVT, persistent pain including anterior knee pain, stiffness, symptomatic hardware, fracture displacement, nonunion, malunion, potential need for future surgery were reviewed.  I discussed my plan for estimated 6 weeks of nonweightbearing to the right lower extremity.  I will anticipate on aspirin 81 mg twice daily for DVT prophylaxis postoperatively.  We did discuss admission for pain control, serial CMS exam, and work with therapy postoperatively.  Following our conversation they expressed a good understanding, and wished to proceed with surgery despite the risks involved.  All questions were answered.  Written informed consent for open versus closed reduction and placement of right tibial intramedullary nail, open reduction internal fixation right medial malleolus, possible open reduction internal fixation right fibula was obtained.  The right lower extremity was marked per myself.    Anjel Degroot MD   Orthopedic Surgery   [facility]

## 2022-12-29 NOTE — ED TRIAGE NOTES
Patient tripped down 2 stairs. Did not hit  Her head. C/o R ankle pain. Noticeable swelling. CMS intact. No thinners.     Triage Assessment     Row Name 12/29/22 0206       Triage Assessment (Adult)    Airway WDL WDL       Respiratory WDL    Respiratory WDL WDL       Skin Circulation/Temperature WDL    Skin Circulation/Temperature WDL WDL       Cardiac WDL    Cardiac WDL WDL       Peripheral/Neurovascular WDL    Peripheral Neurovascular WDL WDL       Cognitive/Neuro/Behavioral WDL    Cognitive/Neuro/Behavioral WDL WDL

## 2022-12-29 NOTE — ED NOTES
Bed: WW-  Expected date:   Expected time:   Means of arrival: Ambulance  Comments:  Fall ankle pain high bp

## 2022-12-30 ENCOUNTER — APPOINTMENT (OUTPATIENT)
Dept: PHYSICAL THERAPY | Facility: CLINIC | Age: 62
End: 2022-12-30
Payer: COMMERCIAL

## 2022-12-30 VITALS
HEIGHT: 62 IN | BODY MASS INDEX: 31.28 KG/M2 | DIASTOLIC BLOOD PRESSURE: 72 MMHG | RESPIRATION RATE: 18 BRPM | TEMPERATURE: 98.3 F | HEART RATE: 79 BPM | SYSTOLIC BLOOD PRESSURE: 143 MMHG | OXYGEN SATURATION: 97 % | WEIGHT: 170 LBS

## 2022-12-30 LAB
ANION GAP SERPL CALCULATED.3IONS-SCNC: 9 MMOL/L (ref 5–18)
BUN SERPL-MCNC: 5 MG/DL (ref 8–22)
CALCIUM SERPL-MCNC: 8.6 MG/DL (ref 8.5–10.5)
CHLORIDE BLD-SCNC: 109 MMOL/L (ref 98–107)
CO2 SERPL-SCNC: 22 MMOL/L (ref 22–31)
CREAT SERPL-MCNC: 0.66 MG/DL (ref 0.6–1.1)
ERYTHROCYTE [DISTWIDTH] IN BLOOD BY AUTOMATED COUNT: 12.3 % (ref 10–15)
GFR SERPL CREATININE-BSD FRML MDRD: >90 ML/MIN/1.73M2
GLUCOSE BLD-MCNC: 128 MG/DL (ref 70–125)
HCT VFR BLD AUTO: 35.5 % (ref 35–47)
HGB BLD-MCNC: 11.7 G/DL (ref 11.7–15.7)
MCH RBC QN AUTO: 31.8 PG (ref 26.5–33)
MCHC RBC AUTO-ENTMCNC: 33 G/DL (ref 31.5–36.5)
MCV RBC AUTO: 97 FL (ref 78–100)
PLATELET # BLD AUTO: 300 10E3/UL (ref 150–450)
POTASSIUM BLD-SCNC: 3.8 MMOL/L (ref 3.5–5)
RBC # BLD AUTO: 3.68 10E6/UL (ref 3.8–5.2)
SODIUM SERPL-SCNC: 140 MMOL/L (ref 136–145)
WBC # BLD AUTO: 8 10E3/UL (ref 4–11)

## 2022-12-30 PROCEDURE — 97116 GAIT TRAINING THERAPY: CPT | Mod: GP

## 2022-12-30 PROCEDURE — G0378 HOSPITAL OBSERVATION PER HR: HCPCS

## 2022-12-30 PROCEDURE — 250N000013 HC RX MED GY IP 250 OP 250 PS 637

## 2022-12-30 PROCEDURE — 250N000011 HC RX IP 250 OP 636

## 2022-12-30 PROCEDURE — 36415 COLL VENOUS BLD VENIPUNCTURE: CPT

## 2022-12-30 PROCEDURE — 99217 PR OBSERVATION CARE DISCHARGE: CPT | Mod: GC

## 2022-12-30 PROCEDURE — 85027 COMPLETE CBC AUTOMATED: CPT

## 2022-12-30 PROCEDURE — 80048 BASIC METABOLIC PNL TOTAL CA: CPT

## 2022-12-30 PROCEDURE — 97162 PT EVAL MOD COMPLEX 30 MIN: CPT | Mod: GP

## 2022-12-30 RX ORDER — ACETAMINOPHEN 325 MG/1
650 TABLET ORAL EVERY 4 HOURS PRN
Qty: 60 TABLET | Refills: 0 | Status: SHIPPED | OUTPATIENT
Start: 2023-01-01

## 2022-12-30 RX ORDER — OXYCODONE HYDROCHLORIDE 5 MG/1
2.5-5 TABLET ORAL EVERY 4 HOURS PRN
Qty: 20 TABLET | Refills: 0 | Status: SHIPPED | OUTPATIENT
Start: 2022-12-30

## 2022-12-30 RX ORDER — AMOXICILLIN 250 MG
1 CAPSULE ORAL 2 TIMES DAILY
Qty: 30 TABLET | Refills: 0 | Status: SHIPPED | OUTPATIENT
Start: 2022-12-30

## 2022-12-30 RX ORDER — ACETAMINOPHEN 325 MG/1
650 TABLET ORAL EVERY 4 HOURS PRN
Qty: 60 TABLET | Refills: 0 | COMMUNITY
Start: 2023-01-01 | End: 2022-12-30

## 2022-12-30 RX ADMIN — SENNOSIDES AND DOCUSATE SODIUM 1 TABLET: 50; 8.6 TABLET ORAL at 09:22

## 2022-12-30 RX ADMIN — CEFAZOLIN 1 G: 1 INJECTION, POWDER, FOR SOLUTION INTRAMUSCULAR; INTRAVENOUS at 00:38

## 2022-12-30 RX ADMIN — ACETAMINOPHEN 975 MG: 325 TABLET ORAL at 04:18

## 2022-12-30 RX ADMIN — AMLODIPINE BESYLATE 10 MG: 10 TABLET ORAL at 09:22

## 2022-12-30 RX ADMIN — CEFAZOLIN 1 G: 1 INJECTION, POWDER, FOR SOLUTION INTRAMUSCULAR; INTRAVENOUS at 09:22

## 2022-12-30 RX ADMIN — ACETAMINOPHEN 975 MG: 325 TABLET ORAL at 11:58

## 2022-12-30 RX ADMIN — POLYETHYLENE GLYCOL 3350 17 G: 17 POWDER, FOR SOLUTION ORAL at 09:22

## 2022-12-30 RX ADMIN — ASPIRIN 81 MG: 81 TABLET ORAL at 09:22

## 2022-12-30 ASSESSMENT — ACTIVITIES OF DAILY LIVING (ADL)
DIFFICULTY_EATING/SWALLOWING: NO
WEAR_GLASSES_OR_BLIND: YES
ADLS_ACUITY_SCORE: 41
WALKING_OR_CLIMBING_STAIRS_DIFFICULTY: NO
DRESSING/BATHING_DIFFICULTY: NO
ADLS_ACUITY_SCORE: 21
CONCENTRATING,_REMEMBERING_OR_MAKING_DECISIONS_DIFFICULTY: NO
ADLS_ACUITY_SCORE: 21
ADLS_ACUITY_SCORE: 41
DOING_ERRANDS_INDEPENDENTLY_DIFFICULTY: NO
ADLS_ACUITY_SCORE: 41
NUMBER_OF_TIMES_PATIENT_HAS_FALLEN_WITHIN_LAST_SIX_MONTHS: 1
ADLS_ACUITY_SCORE: 41
TOILETING_ISSUES: NO
VISION_MANAGEMENT: GLASSES
ADLS_ACUITY_SCORE: 41
CHANGE_IN_FUNCTIONAL_STATUS_SINCE_ONSET_OF_CURRENT_ILLNESS/INJURY: NO
FALL_HISTORY_WITHIN_LAST_SIX_MONTHS: YES
ADLS_ACUITY_SCORE: 21

## 2022-12-30 NOTE — PROGRESS NOTES
Brief Orthopedic Progress Note:     Jaja was seen in the PACU. She reports pain is controlled. Denies numbness or tingling. No nausea or vomiting. She was resting comfortably at the time of visit. Splint c/d/i. She was able to plantar flex and dorsiflex toes without significant discomfort. Sensation intact to light touch in the dp/sp/tibial nerve distributions. DP pulse palpable and toes wwp. Continue with plan previously outlined in brief op note.     Anjel Degroot MD   Orthopedic Surgery   [facility]

## 2022-12-30 NOTE — PLAN OF CARE
Pt cleared for discharge per Ortho & BFM. Per Ortho PA, Koki Stone, okay to start APAP right away & disregard beginning on 1/1/2023. Removed PIV, assisted in gathering pt's belongings & discharge RN reviewed AVS to pt & 's verbalized satisfaction & understanding. Hospital staff escorted pt to main entrance & pt discharged to home. Pt left via private vehicle accompanied by family.     Ellie Acosta, ZAYRAN  Shift: 0700 - [phone]

## 2022-12-30 NOTE — PROGRESS NOTES
[facility]    Progress Note - Hospitalist Service       Date of Admission:  12/29/2022    Assessment & Plan   Jaja Pratt is a 62 year old female admitted on 12/29/2022. She has a history of HTN, obesity and is admitted for R tibial-fibular and medial malleolar fractures. Presented to ED after mechanical fall with inability to ambulate; no head injury or LOC. CT and x-ray showing R midshaft tibial, distal fibular, and medial malleolus fracture. Pre-op labs unremarkable, EKG with NSR. Ortho consulted and pt underwent right tibia percutaneous reduction and insertion of intramedullary nail, right medial malleolus open reduction internal fixation, right distal fibula open reduction internal fixation on 12/29 without complication. Doing well post-surgery - advancing diet, pain controlled, had BM, able to ambulate with walker. PT recommending home with assist. Awaiting OT and Ortho recommendations for discharge.    R fibular fracture, tibial fracture, medial malleolar fracture  AM Hgb post-op day 1 normal.  -Orthopedics consulted, appreciate cares and recs:  Activity: Up with assist and assistive device (walker) until independent.  Weight bearing status: NWB RLE x 6 weeks.  Pain management: Transition from IV to PO as tolerated.    Antibiotics: Ancef x 24 hours (ending today)  Diet: Begin with clear fluids and progress diet as tolerated.  Bracing/Splinting: keep bulky flannery splint in place and c/d/i until follow up  Elevation: Elevate RLE on pillows to keep above the level of the heart as much as possible.   Consults: PT/OT.  Follow-up: Clinic in 2 weeks for suture removal with repeat x-rays needed.     HTN  -PTA amlodipine  -PRN hydralazine     Diet: Advance Diet as Tolerated: Regular Diet Adult    DVT Prophylaxis: ASA 81 mg BID and mechanical; discharge with ASA 81 mg BID x 6 weeks  Dorman Catheter: Not present  Fluids: PO  Central Lines: None  Cardiac Monitoring: None  Code Status: Full  Code      Disposition Plan     Expected Discharge Date: 12/31/2022        Discharge Comments: surgery 12/29, pending postop recovery        The patient's care was discussed with the attending physician, Dr. Souza.    Anu Fair MD PGY-1  [facility] Family Medicine Resident Team  [facility]  Securely message with the Vocera Web Console (learn more here)  Text page via [employer] Paging/Directory     ______________________________________________________________________    Interval History   No acute events overnight.  Patient reports pain is controlled and she has not had to take oxycodone, only taking Tylenol.  Had a bowel movement and passing flatus.  Voiding.  Tolerating regular diet.  No chest pain, dyspnea, abdominal pain, or nausea.  With PT this morning and was able to ambulate in the hallway with a walker.    Data reviewed today: I reviewed all medications, new labs and imaging results over the last 24 hours.    Physical Exam   Vital Signs: Temp: 98.3  F (36.8  C) Temp src: Oral BP: (!) 143/72 Pulse: 79   Resp: 18 SpO2: 97 % O2 Device: None (Room air) Oxygen Delivery: 3 LPM  Weight: 170 lbs 0 oz  General Appearance: Alert, lying in bed, no acute distress  Respiratory: Lungs clear to auscultation bilaterally  Cardiovascular: Regular rate and rhythm, normal S1 and S2, no murmurs, no lower extremity edema to left leg  GI: Soft, bowel sounds present, nontender, nondistended  Extremities: Right leg wrapped and splinted.  Sensation intact in right toes, able to wiggle right toes, normal strength R toe plantar flexion.    Data   Recent Labs   Lab 12/30/22  0650 12/29/22  1257 12/29/22  0500   WBC 8.0  --  7.7   HGB 11.7  --  13.7   MCV 97  --  93     --  337   INR  --   --  1.00     --  140   POTASSIUM 3.8  --  3.6   CHLORIDE 109*  --  108*   CO2 22  --  20*   BUN 5*  --  5*   CR 0.66  --  0.62   ANIONGAP 9  --  12   MARA 8.6  --  8.9   * 120* 114     Recent Results (from the  "past 24 hour(s))   POC US Guidance Needle Placement    Narrative    Ultrasound was performed as guidance to an anesthesia procedure.  Click   \"PACS images\" hyperlink below to view any stored images.  For specific   procedure details, view procedure note authored by anesthesia.   XR Surgery GIANLUCA  Fluoro G/T 5 Min    Narrative    This exam was marked as non-reportable because it will not be read by a   radiologist or a [place] non-radiologist provider.         XR Tibia & Fibula Port Right 2 Views    Narrative    EXAM: XR TIBIA and FIBULA PORT RIGHT 2 VIEWS  LOCATION: [facility]  DATE/TIME: 12/29/2022 7:11 PM    INDICATION: Postop imaging.  COMPARISON: 12/29/2022 at 0352 hours      Impression    IMPRESSION: Interval placement of an intramedullary etienne with proximal distal interlocking screws transfixing a distal to mid tibial fracture. Sideplate and screw fixation also involves the distal portion of the distal tibia as well as the distal aspect   of the fibula laterally which transfixes a distal fibular fracture. Alignment is anatomic.     Medications       acetaminophen  975 mg Oral Q8H     amLODIPine  10 mg Oral Daily     aspirin  81 mg Oral BID     ondansetron  4 mg Intravenous Once     polyethylene glycol  17 g Oral Daily     senna-docusate  1 tablet Oral BID     sodium chloride (PF)  3 mL Intracatheter Q8H     sodium chloride (PF)  3 mL Intracatheter Q8H     "

## 2022-12-30 NOTE — PLAN OF CARE
Patient vital signs are at baseline: Yes  Patient able to ambulate as they were prior to admission or with assist devices provided by therapies during their stay:  No,  Reason:  Pt is NWB to R, only pivoting to the BSC so far. PT/OT will work with her today  Patient MUST void prior to discharge:  Yes  Patient able to tolerate oral intake:  Yes  Pain has adequate pain control using Oral analgesics:  Yes  Does patient have an identified :  Yes  Has goal D/C date and time been discussed with patient:  Yes

## 2022-12-30 NOTE — PROGRESS NOTES
Care Management Follow Up    Length of Stay (days): 1    Expected Discharge Date: 12/31/2022     Concerns to be Addressed: no discharge needs identified     Patient plan of care discussed at interdisciplinary rounds: Yes    Anticipated Discharge Disposition:  Home with       Anticipated Discharge Services:  None anticipated   Anticipated Discharge DME:  TBD     Patient/family educated on Medicare website which has current facility and service quality ratings:  No   Education Provided on the Discharge Plan: No   Patient/Family in Agreement with the Plan: yes    Referrals Placed by CM/SW:  None   Private pay costs discussed: Not applicable    Additional Information:  Chart reviewed.  No CM needs identified or recommended. Pt lives with spouse in a house.  Family to transport.      DEJA Moscoso

## 2022-12-30 NOTE — PLAN OF CARE
Problem: Fall Injury Risk  Goal: Absence of Fall and Fall-Related Injury  Outcome: Progressing  Intervention: Identify and Manage Contributors  Recent Flowsheet Documentation  Taken 12/29/2022 2050 by José Miguel King RN  Medication Review/Management: medications reviewed  Intervention: Promote Injury-Free Environment  Recent Flowsheet Documentation  Taken 12/29/2022 2050 by José Miguel King RN  Safety Promotion/Fall Prevention:    activity supervised    bed alarm on    nonskid shoes/slippers when out of bed    room door open    room near nurse's station    room organization consistent    Pt alert and oriented x 4. VSS. Lung sounds clear. Denies SOB, chest pain and N/V. Up with heavy assist 1. Purewick placed @ HS. Splint brace in place to RLE. CMS intact. Alarms on for safety.     Patient vital signs are at baseline: Yes  Patient able to ambulate as they were prior to admission or with assist devices provided by therapies during their stay:  No,  Reason:  Unable to ambulate far distance. Gait unsteady.  Patient MUST void prior to discharge:  Yes  Patient able to tolerate oral intake:  Yes  Pain has adequate pain control using Oral analgesics:  Yes  Does patient have an identified :  Yes  Has goal D/C date and time been discussed with patient:  Yes

## 2022-12-30 NOTE — UTILIZATION REVIEW
"Admission Status; Secondary Review Determination     Under the authority of the Utilization Management Committee, the utilization review process indicated a secondary review on Jaja Pratt.  The review outcome is based on review of the medical records, discussions with staff, and applying clinical experience noted on the date of the review.     (x) Observation Status Appropriate - This patient does not meet hospital inpatient criteria and is placed in observation status. If this patient's primary payer is Medicare and was admitted as an inpatient, Condition Code 44 should be used and patient status changed to \"observation\".     RATIONALE FOR DETERMINATION   62 yr old female with HTN presented 12/29/22 after fall.  Right ankle lateral malleolus fibular fracture and mid shaft fibular fracture.  S/P ORIF.  Doing well and medically cleared for discharge.    The severity of illness, intensity of service provided, expected LOS and risk for adverse outcome make the care appropriate for further observation; however, doesn't meet criteria for hospital inpatient admission. Dr Fair/Harley notified of this determination and agrees with downgrade of status.      The information on this document is developed by the utilization review team in order for the business office to ensure compliance.  This only denotes the appropriateness of proper admission status and does not reflect the quality of care rendered.         The definitions of Inpatient Status and Observation Status used in making the determination above are those provided in the CMS Coverage Manual, Chapter 1 and Chapter 6, section 70.4.      Sincerely,  Tamar Das MD  Utilization Review  Physician Advisor  [facility]    "

## 2022-12-30 NOTE — PROGRESS NOTES
12/30/22 5429   Appointment Info   Signing Clinician's Name / Credentials (PT) Gustavo Alonzo, PT, DPT   Living Environment   People in Home spouse   Current Living Arrangements house   Home Accessibility stairs within home   Number of Stairs, Within Home, Primary two   Self-Care   Usual Activity Tolerance good   Current Activity Tolerance moderate   Equipment Currently Used at Home walker, rolling   Fall history within last six months yes   Number of times patient has fallen within last six months 1   General Information   Onset of Illness/Injury or Date of Surgery 12/29/22   Referring Physician Dr. Sabillon   Patient/Family Therapy Goals Statement (PT) Improve overall mobility   Pertinent History of Current Problem (include personal factors and/or comorbidities that impact the POC) Tibia ORIF   Existing Precautions/Restrictions weight bearing   Weight-Bearing Status - LLE full weight-bearing   Weight-Bearing Status - RLE nonweight-bearing   Range of Motion (ROM)   ROM Comment WFL, decreased RLE s/p ORIF due to cast   Strength (Manual Muscle Testing)   Strength Comments WFL   Transfers   Transfers sit-stand transfer   Maintains Weight-bearing Status (Transfers) able to maintain   Sit-Stand Transfer   Sit-Stand [place] (Transfers) contact guard   Gait/Stairs (Locomotion)   [place] Level (Gait) contact guard   Assistive Device (Gait) walker, front-wheeled   Distance in Feet 10'   Distance in Feet (Gait) 20', 5' x 2   Pattern (Gait) swing-to   Deviations/Abnormal Patterns (Gait) [place] decreased;gait speed decreased   Maintains Weight-bearing Status (Gait) able to maintain   Comment, (Gait/Stairs) Bumped up/down 3 stairs on their bottom   Clinical Impression   Criteria for Skilled Therapeutic Intervention Yes, treatment indicated   PT Diagnosis (PT) Impaired functional mobility   Influenced by the following impairments Weakness, WB status   Functional limitations due to impairments Transfers, gait, stairs    Clinical Presentation (PT Evaluation Complexity) Stable/Uncomplicated   Clinical Presentation Rationale Pt presents as medically diagnosed   Clinical Decision Making (Complexity) moderate complexity   Planned Therapy Interventions (PT) gait training;home exercise program;patient/family education;stair training;transfer training   Anticipated Equipment Needs at Discharge (PT) walker, rolling   Risk & Benefits of therapy have been explained care plan/treatment goals reviewed;patient   PT Total Evaluation Time   PT Eval, Moderate Complexity Minutes (73705) 10   Plan of Care Review   Plan of Care Reviewed With patient   Physical Therapy Goals   PT Frequency 2x/day   PT Predicted Duration/Target Date for Goal Attainment 01/02/23   PT Goals Transfers;Gait;Stairs   PT: Transfers Supervision/stand-by assist;Sit to/from stand   PT: Gait Supervision/stand-by assist;Rolling walker;50 feet   PT: Stairs Supervision/stand-by assist;2 stairs   Interventions   Interventions Quick Adds Gait Training;Therapeutic Activity   Therapeutic Activity   Treatment Detail/Skilled Intervention Sit<>stand x2 CGA, pushing from armrests well, no concerns in this area.   Gait Training   Gait Training Minutes (82607) 25   Symptoms Noted During/After Treatment (Gait Training) fatigue   Treatment Detail/Skilled Intervention Pt amb around the room CGA with FWW, moving slowly but no LOB or adverse s/s, adhering to WB precautions. Pt bumping up/down 3 stairs on their bottom with Min A to help manage RLE, cues for technique and safety. Mod A to help sit<>stand from stairs. Increased time for set up and carry out.   [place] Level (Gait Training) contact guard   Physical Assistance Level (Gait Training) supervision;verbal cues   Weight Bearing (Gait Training) nonweight-bearing   Assistive Device (Gait Training) rolling walker   Pattern Analysis (Gait Training) swing-to gait   Gait Analysis Deviations decreased [place];decreased step length   Physical  Assist/Nonphysical Assist (Stairs) supervision;verbal cues;1 person assist   Level of [place] (Stairs) moderate assist (50% patient effort)   PT Discharge Planning   PT Plan Amb, stairs, transfers   PT Discharge Recommendation (DC Rec) (S)  home with assist   PT Rationale for DC Rec Spouse at home to assist as needed, ambulating well short distances with FWW, negotiated stairs bumping on their bottom, no OT needs identified.   PT Brief overview of current status Ax1 for safety and mgmt of RLE, amb 20' with FWW, completing stairs by bumping on bottom   [facility]  OUTPATIENT PHYSICAL THERAPY EVALUATION  PLAN OF TREATMENT FOR OUTPATIENT REHABILITATION  (COMPLETE FOR INITIAL CLAIMS ONLY)  Patient's Last Name, First Name, M.I.  YOB: 1960  Jaja Pratt                        Provider's Name  [facility] Medical Record No.  [id number]                             Onset Date:  12/29/22   Start of Care Date:      Type:     _X_PT   ___OT   ___SLP Medical Diagnosis:                 PT Diagnosis:  Impaired functional mobility Visits from SOC:  1     See note for plan of treatment, functional goals and certification details    I CERTIFY THE NEED FOR THESE SERVICES FURNISHED UNDER        THIS PLAN OF TREATMENT AND WHILE UNDER MY CARE     (Physician co-signature of this document indicates review and certification of the therapy plan).

## 2022-12-30 NOTE — ANESTHESIA POSTPROCEDURE EVALUATION
Patient: Jaja Pratt    Procedure: Procedure(s):  OPEN VERSUS CLOSED REDUCTION INTERNAL FIXATION, FRACTURE, RIGHT TIBIA, USING INTRAMEDULLARY NAIL  OPEN REDUCTION INTERNAL FIXATION, FRACTURE, RIGHT MEDIAL MALLEOLUS, OPEN REDUCTION INTERNAL FIXATION RIGHT FIBULA       Anesthesia Type:  General    Note:  Disposition: Inpatient   Postop Pain Control: Uneventful            Sign Out: Well controlled pain   PONV: No   Neuro/Psych: Uneventful            Sign Out: Acceptable/Baseline neuro status   Airway/Respiratory: Uneventful            Sign Out: Acceptable/Baseline resp. status   CV/Hemodynamics: Uneventful            Sign Out: Acceptable CV status   Other NRE:    DID A NON-ROUTINE EVENT OCCUR?            Last vitals:  Vitals Value Taken Time   /85 12/29/22 1815   Temp 37.2  C (98.9  F) 12/29/22 1753   Pulse 96 12/29/22 1828   Resp 16 12/29/22 1828   SpO2 95 % 12/29/22 1830   Vitals shown include unvalidated device data.    Electronically Signed By: Brayden Staton MD  December 29, 2022  6:31 PM

## 2022-12-30 NOTE — PROGRESS NOTES
"Orthopedic Progress Note      Assessment: 1 Day Post-Op  S/P Procedure(s):  OPEN VERSUS CLOSED REDUCTION INTERNAL FIXATION, FRACTURE, RIGHT TIBIA, USING INTRAMEDULLARY NAIL  OPEN REDUCTION INTERNAL FIXATION, FRACTURE, RIGHT MEDIAL MALLEOLUS, OPEN REDUCTION INTERNAL FIXATION RIGHT FIBULA     Plan:   - Continue PT/OT. Do not use knee scooter given incision locations.   - Weightbearing status: NWB RLE x6 weeks.  - Anticoagulation: Aspirin 81 mg bid x6 weeks, in addition to SCDs, sergio stockings and early ambulation.  - Discharge planning: Okay to discharge from ortho standpoint pending progress with PT/OT. Ortho discharge orders placed.    Subjective:  Patient doing well today. Pain controlled with Tylenol. Denies any numbness or tingling in her right toes. Hgb 11.7.    Objective:  BP (!) 143/72 (BP Location: Right arm)   Pulse 79   Temp 98.3  F (36.8  C) (Oral)   Resp 18   Ht 1.575 m (5' 2\")   Wt 77.1 kg (170 lb)   SpO2 97%   BMI 31.09 kg/m    The patient is A&Ox3. Appears comfortable.   Sensation is intact to light touch in all 5 toes on right.  Right toe flexion & extension intact.  Palpable right dorsalis pedis pulse.  Calves are soft and non-tender. Negative Humza's.  Right foot bulky Avery dressing in place. Dry.     Pertinent Labs   Lab Results: personally reviewed.   Lab Results   Component Value Date    INR 1.00 12/29/2022     Lab Results   Component Value Date    WBC 8.0 12/30/2022    HGB 11.7 12/30/2022    HCT 35.5 12/30/2022    MCV 97 12/30/2022     12/30/2022     Lab Results   Component Value Date     12/30/2022    CO2 22 12/30/2022       Report completed by:  Koki Stone PA-C  [facility]    Date: 12/30/2022  Time: 11:39 AM    "

## 2022-12-30 NOTE — DISCHARGE SUMMARY
[facility]  Discharge Summary - Medicine & Pediatrics       Date of Admission:  12/29/2022  Date of Discharge:  12/30/2022  Discharging Provider: Dr. Navya Souza and Dr. Lyndsay Antunez  Discharge Service: Hospitalist Service    Discharge Diagnoses   Principal Problem:    Closed fracture of right tibia and fibula, initial encounter (12/29/2022)  Active Problems:    Fall, initial encounter (12/29/2022)      Follow-ups Needed After Discharge   Follow-up Appointments     Follow Up Care      Please follow-up with Dr. Degroot's team in 2 weeks at [facility]. Call our scheduling line at [phone] to make an   appointment.             Unresulted Labs Ordered in the Past 30 Days of this Admission     No orders found for last 31 day(s).      These results will be followed up by none    Discharge Disposition   Discharged to home  Condition at discharge: Stable    Hospital Course   Jaja Pratt was admitted on 12/29/2022 for R tibia fibula and middle maleolar fracture.  The following problems were addressed during her hospitalization:    R fibular fracture, tibial fracture, medial malleolar fracture  Presented to ED after mechanical fall with inability to ambulate; no head injury or LOC. CT and x-ray showing R midshaft tibial, distal fibular, and medial malleolus fracture. Pre-op labs unremarkable, EKG with NSR. Ortho consulted and pt underwent right tibia percutaneous reduction and insertion of intramedullary nail, right medial malleolus open reduction internal fixation, right distal fibula open reduction internal fixation on 12/29 without complication. Cleared to return home by PT and Orthopedics. NWB on RLE for 6 weeks and follow up with [facility] in 2 weeks for x rays and suture removal. Placed on aspirin for 6 weeks.     HTN  Continued home amlodipine with as needed hydralazine for blood pressure control.     Consultations This Hospital Stay   ORTHOPEDIC SURGERY IP  "CONSULT  PHYSICAL THERAPY ADULT IP CONSULT  OCCUPATIONAL THERAPY ADULT IP CONSULT    Code Status   Full Code       The patient was discussed with Dr. Navya Souza and Dr. Anu Antunez MD  [facility] [address] [address]  Phone: [phone]  Fax: [phone]  ______________________________________________________________________    Physical Exam   Vital Signs: Temp: 98.3  F (36.8  C) Temp src: Oral BP: (!) 143/72 Pulse: 79   Resp: 18 SpO2: 97 % O2 Device: None (Room air) Oxygen Delivery: 3 LPM  Weight: 170 lbs 0 oz  General Appearance:  Alert, lying in bed, no acute distress  Respiratory: Lungs clear to auscultation bilaterally  Cardiovascular: Regular rate and rhythm, normal S1 and S2, no murmurs, no lower extremity edema to left leg  GI: Soft, bowel sounds present, nontender, nondistended  Extremities: Right leg wrapped and splinted.  Sensation intact in right toes, able to wiggle right toes, normal strength R toe plantar flexion.    Primary Care Physician   MARIO ALBERTO BARBOZA    Discharge Orders      When to call - Contact Surgeon Team    You may experience symptoms that require follow-up before your scheduled appointment. Refer to the \"Stoplight Tool\" for instructions on when to contact your Surgeon Team if you are concerned about pain control, blood clots, constipation, or if you are unable to urinate.     When to call - Reach out to Urgent Care    If you are not able to reach your Surgeon Team and you need immediate care, go to the Orthopedic Walk-in Clinic or Urgent Care at your Surgeon's office.  Do NOT go to the Emergency Room unless you have shortness of breath, chest pain, or other signs of a medical emergency.     When to call - Reasons to Call 911    Call 911 immediately if you experience sudden-onset chest pain, arm weakness/numbness, " slurred speech, or shortness of breath     Discharge Instruction - Breathing exercises    Perform breathing exercises using your Incentive Spirometer 10 times per hour while awake for 2 weeks.     Symptoms - Fever Management    A low grade fever can be expected after surgery.  Use acetaminophen (TYLENOL) as needed for fever management.  Contact your Surgeon Team if you have a fever greater than 101.5 F, chills, and/or night sweats.     Symptoms - Constipation management    Constipation (hard, dry bowel movements) is expected after surgery due to the combination of being less active, the anesthetic, and the opioid pain medication.  You can do the following to help reduce constipation:  ~  FLUIDS:  Drink clear liquids (water or Gatorade), or juice (apple/prune).  ~  DIET:  Eat a fiber rich diet.    ~  ACTIVITY:  Get up and move around several times a day.  Increase your activity as you are able.  MEDICATIONS:  Reduce the risk of constipation by starting medications before you are constipated.  You can take Miralax   (1 packet as directed) and/or a stool softener (Senokot 1-2 tablets 1-2 times a day).  If you already have constipation and these medications are not working, you can get magnesium citrate and use as directed.  If you continue to have constipation you can try an over the counter suppository or enema.  Call your Surgeon Team if it has been greater than 3 days since your last bowel movement.     Symptoms - Reduced Urine Output    Changes in the amount of fluids you drank before and after surgery may result in problems urinating.  It is important to stay well-hydrated after surgery and drink plenty of water. If it has been greater than 8 hours since you have urinated despite drinking plenty of water, call your Surgeon Team.     Activity - Exercises to prevent blood clots    Unless otherwise directed by your Surgeon team, perform the following exercises at least three times per day for the first four weeks  after surgery to prevent blood clots in your legs: 1) Point and flex your feet (Ankle Pumps), 2) Move your ankle around in big circles, 3) Wiggle your toes, 4) Walk, even for short distances, several times a day, will help decrease the risk of blood clots.     Comfort and Pain Management - Pain after Surgery    Pain after surgery is normal and expected.  You will have some amount of pain for several weeks after surgery.  Your pain will improve with time.  There are several things you can do to help reduce your pain including: rest, ice, elevation, and using pain medications as needed. Contact your Surgeon Team if you have pain that persists or worsens after surgery despite rest, ice, elevation, and taking your medication(s) as prescribed. Contact your Surgeon Team if you have new numbness, tingling, or weakness in your operative extremity.     Comfort and Pain Management - Swelling after Surgery    Swelling and/or bruising of the surgical extremity is common and may persist for several months after surgery. In addition to frequent icing and elevation, gentle compressive support with an ACE wrap or tubigrip may help with swelling. Apply compression regularly, removing at least twice daily to perform skin checks. Contact your Surgeon Team if your swelling increases and is NOT associated with an increase in your activity level, or if your swelling increases and is associated with redness and pain.     Comfort and Pain Management - Cold therapy    Ice can be used to control swelling and discomfort after surgery. Place a thin towel over your operative site and apply the ice pack overtop. Leave ice pack in place for 20 minutes, then remove for 20 minutes. Repeat this 20 minutes on/20 minutes off routine as often as tolerated.     Medication Instructions - Acetaminophen (TYLENOL) Instructions    You were discharged with acetaminophen (TYLENOL) for pain management after surgery. Acetaminophen most effectively manages pain  "symptoms when it is taken on a schedule without missing doses (every four, six, or eight hours). Your Provider will prescribe a safe daily dose between 3000 - 4000 mg.  Do NOT exceed this daily dose. Most patients use acetaminophen for pain control for the first four weeks after surgery.  You can wean from this medication as your pain decreases.     Medication Instructions - Opioids - Tapering Instructions    In the first three days following surgery, your symptoms may warrant use of the narcotic pain medication every four to six hours as prescribed. This is normal. As your pain symptoms improve, focus your efforts on decreasing (tapering) use of narcotic medications. The most successful tapering strategy is to first, decrease the number of tablets you take every 4-6 hours to the minimum prescribed. Then, increase the amount of time between doses.  For example:  First, taper to   or 1 tablet every 4-6 hours.  Then, taper to   or 1 tablet every 6-8 hours.  Then, taper to   or 1 tablet every 8-10 hours.  Then, taper to   or 1 tablet every 10-12 hours.  Then, taper to   or 1 tablet at bedtime.  The bedtime dose can help with comfort during sleep and is typically the last dose to be discontinued after surgery.     Follow Up Care    Please follow-up with Dr. Degroot's team in 2 weeks at [facility]. Call our scheduling line at [phone] to make an appointment.     Medication instructions -  Anticoagulation - aspirin    Take the aspirin as prescribed for a total of 6 weeks after surgery.  This is given to help minimize your risk of blood clot.     Comfort and Pain Management - LOWER Extremity Elevation    Swelling is expected for several months after surgery. This type of swelling is usually associated with gravity and activity, and can be improved with elevation.   The best way to do this is to get your \"toes above your nose\" by laying down and placing several pillows lengthwise under your calf and heel. When " elevating your leg keep your knee completely straight. Perform this elevation as often as possible especially for the first two weeks after surgery.     Medication Instructions - Opioid Instructions (0.5 - 1 tablet Q 4-6 hours, MAX 4 tablets)    You were discharged with an opioid medication (hydromorphone, oxycodone, hydrocodone, or tramadol). This medication should only be taken for breakthrough pain that is not controlled with acetaminophen (TYLENOL). If you rate your pain less than 3 you do not need this medication.  Pain rating 0-3:  You do not need this medication  Pain rating 4-6:  Take 1/2 tablet every 4-6 hours as needed  Pain rating 7-10:  Take 1 tablet every 4-6 hours as needed  Do not exceed 4 tablets per day     Return to Driving    Return to driving - Driving is NOT permitted until directed by your provider. Under no circumstance are you permitted to drive while using narcotic pain medications.     Dressing / Wound Care - Wound    You have a clean dressing on your surgical wound. Dressing change instructions as follows: dressing will be removed at your follow-up appointment. Contact your Surgeon Team if you have increased redness, warmth around the surgical wound, and/or drainage from the surgical wound.     Dressing / Wound Care - NO Tub Bathing    Tub bathing, swimming, or any other activities that will cause your incision to be submerged in water should be avoided until allowed by your Surgeon.     NO weight bearing    No weight bearing on your operative extremity.     Activity     Splint / Cast    Do NOT remove your splint/cast.  Keep your splint/cast clean and dry.  If your splint/cast gets wet, contact your surgeon team.     Dressing / Wound care - Shower with wound/dressing covered    You must COVER your dressing or incision with saran wrap (or any other non-permeable covering) to allow the incision to remain dry while showering.  You may shower 3 days after surgery as long as the surgical wound  stays dry. Continue to cover your dressing or incision for showering until your first office visit.  You are strictly prohibited from soaking or submerging the surgical wound underwater.     Reason for your hospital stay    R tibia fibula fracture and repair     [facility]    DME Documentation: Describe the reason for need to support medical necessity: Impaired gait due to Foot/Ankle surgery. Anticipated length of need: 3 months     Discharge Instruction - Regular Diet Adult    Return to your pre-surgery diet unless instructed otherwise       Significant Results and Procedures   Most Recent 3 CBC's:Recent Labs   Lab Test 12/30/22  0650 12/29/22  0500   WBC 8.0 7.7   HGB 11.7 13.7   MCV 97 93    337     Most Recent 3 BMP's:Recent Labs   Lab Test 12/30/22  0650 12/29/22  1257 12/29/22  0500     --  140   POTASSIUM 3.8  --  3.6   CHLORIDE 109*  --  108*   CO2 22  --  20*   BUN 5*  --  5*   CR 0.66  --  0.62   ANIONGAP 9  --  12   MARA 8.6  --  8.9   * 120* 114     Most Recent 2 LFT's:No lab results found.  Most Recent 3 INR's:Recent Labs   Lab Test 12/29/22  0500   INR 1.00   ,   Results for orders placed or performed during the hospital encounter of 12/29/22   Ankle XR, G/E 3 views, right    Narrative    EXAM: XR ANKLE RIGHT G/E 3 VIEWS  LOCATION: [facility]  DATE/TIME: 12/29/2022 4:01 AM    INDICATION: Right ankle pain after fall.  COMPARISON: X-ray right tibia and fibula 2 views 12/29/2022 at 0352 hours.      Impression    IMPRESSION: Comminuted distal right fibular metaphyseal fracture. Partially visualized comminuted right tibial diaphyseal fracture. Subtle lucency along the medial malleolus, worrisome for an undisplaced fracture. Well-corticated density inferior to the   medial malleolus looks like a remote avulsion fragment. Ankle mortise is symmetric. Talar dome is smooth. Diffuse soft tissue swelling right leg and about the right ankle, more laterally.                 XR Tibia and Fibula Right 2 Views    Narrative    EXAM: XR TIBIA AND FIBULA RIGHT 2 VIEWS  LOCATION: [facility]  DATE/TIME: 12/29/2022 4:00 AM    INDICATION: Fall, trauma.  COMPARISON: X-ray right ankle 3 views 12/29/2022 at 0358 hours.      Impression    IMPRESSION: Distal right tibia and fibula are not entirely included in the field-of-study, better demonstrated on right ankle images (please see separate report). Comminuted distal right fibular metaphyseal fracture. Comminuted distal metadiaphyseal   right tibial fracture. Soft tissue swelling and deformity. Mild degenerative changes right knee joint.                     CT Ankle Right w/o Contrast    Narrative    EXAM: CT ANKLE RIGHT W/O CONTRAST  LOCATION: [facility]  DATE/TIME: 12/29/2022 5:23 AM    INDICATION: Comminuted fracture distal right fibular metaphysis as well as distal right tibial diaphyseal fracture. Possible subtle lucency along the medial malleolus. Evaluate for medial malleolar fracture.  COMPARISON: Right ankle radiograph 12/29/2022.  TECHNIQUE: Noncontrast. Axial, sagittal and coronal thin-section reconstruction. Dose reduction techniques were used.     FINDINGS:     BONES:  -Oblique fracture through the anterior aspect of the medial malleolus extending from the anterior medial aspect of the medial malleolus through the central aspect of the tibial plafond to the posterior lateral corner of the tibial plafond (series 5,   images [id number]). No significant displacement or angulation of this oblique fracture.    -Spiral comminuted distal right tibial diaphyseal fracture. Minimal anterior displacement of the cortex relative to the posterior cortex by 7 mm (5-39). No other tibial fractures.    -Comminuted oblique minimally displaced fracture posterior distal left fibula with minimal posterior displacement of the distal fracture fragment by 3 mm. No evidence for widening of the ankle mortise or  "tibiofibular syndesmosis.    -Talus, calcaneus and navicular as well as the visualized aspects of the cuneiforms, cuboid and metatarsals unremarkable.    SOFT TISSUES:  -High density subcutaneous fluid in the anterior aspect of the right lower leg at the spiral tibial diaphyseal fracture site compatible with subcutaneous hematoma.    -Minimal subcutaneous edema or hematoma adjacent to the fibular fracture.    -No muscular or tendinous abnormality.      Impression    IMPRESSION:  1.  Oblique fracture through the anterior aspect of the medial malleolus extending from the anterior medial malleolar surface through the central aspect of the tibial plafond to the posterolateral corner of the tibial plafond. No significant articular   surface offset or displacement of the fracture fragments. No angulation.    2.  Oblique minimally displaced fracture of the distal right fibula as detailed above. No significant widening of the tibiofibular syndesmosis.    3.  Spiral comminuted minimally displaced fracture involving the distal tibial diaphysis.    4.  Subcutaneous edema/hematoma in the right lower leg and ankle as detailed above.                 POC US Guidance Needle Placement    Narrative    Ultrasound was performed as guidance to an anesthesia procedure.  Click   \"PACS images\" hyperlink below to view any stored images.  For specific   procedure details, view procedure note authored by anesthesia.   XR Surgery GIANLUCA  Fluoro G/T 5 Min    Narrative    This exam was marked as non-reportable because it will not be read by a   radiologist or a [facility] non-radiologist provider.         XR Tibia & Fibula Port Right 2 Views    Narrative    EXAM: XR TIBIA and FIBULA PORT RIGHT 2 VIEWS  LOCATION: [facility]  DATE/TIME: 12/29/2022 7:11 PM    INDICATION: Postop imaging.  COMPARISON: 12/29/2022 at 0352 hours      Impression    IMPRESSION: Interval placement of an intramedullary etienne with proximal distal " interlocking screws transfixing a distal to mid tibial fracture. Sideplate and screw fixation also involves the distal portion of the distal tibia as well as the distal aspect   of the fibula laterally which transfixes a distal fibular fracture. Alignment is anatomic.       Discharge Medications   Current Discharge Medication List      START taking these medications    Details   acetaminophen (TYLENOL) 325 MG tablet Take 2 tablets (650 mg) by mouth every 4 hours as needed for other (For optimal non-opioid multimodal pain management to improve pain control.)  Qty: 60 tablet, Refills: 0    Associated Diagnoses: Closed fracture of right tibia and fibula, initial encounter      aspirin (ASA) 81 MG EC tablet Take 1 tablet (81 mg) by mouth 2 times daily  Qty: 84 tablet, Refills: 0    Associated Diagnoses: Closed fracture of right tibia and fibula, initial encounter      oxyCODONE (ROXICODONE) 5 MG tablet Take 0.5-1 tablets (2.5-5 mg) by mouth every 4 hours as needed for moderate to severe pain (Take one-half tab (2.5 mg) for pain rated 4-6. Take 1 tab (5 mg) for pain rated 7-10.)  Qty: 20 tablet, Refills: 0    Associated Diagnoses: Closed fracture of right tibia and fibula, initial encounter      senna-docusate (SENOKOT-S/PERICOLACE) 8.6-50 MG tablet Take 1 tablet by mouth 2 times daily  Qty: 30 tablet, Refills: 0    Associated Diagnoses: Closed fracture of right tibia and fibula, initial encounter         CONTINUE these medications which have NOT CHANGED    Details   amLODIPine (NORVASC) 10 MG tablet Take 10 mg by mouth daily           Allergies   No Known Allergies

## 2022-12-30 NOTE — PROGRESS NOTES
"POST-OP CHECK    Jaja Pratt was seen post-operatively after right tibia percutaneous reduction and insertion of intramedullary nail, right medial malleolus open reduction internal fixation, right distal fibula open reduction internal fixation. Doing fine overall. Endorses pain in RLE, rated 4 out of 10 that is controlled on current pain medication regimen. Denies any shortness of breath, chest pain/discomfort, nausea, vomiting. All questions were answered.     BP (!) 144/85   Pulse 96   Temp 98.9  F (37.2  C) (Temporal)   Resp 16   Ht 1.575 m (5' 2\")   Wt 77.1 kg (170 lb)   SpO2 95%   BMI 31.09 kg/m    GENERAL: Alert, awake, oriented. No acute distress.   HEENT: EOMI. No scleral icterus or conjunctival injection. Oral cavity moist and pink with no ulcers, exudate, or thrush present.   SKIN: Warm and dry. No bruises, rashes, or skin lesions.  LUNGS: Normal work of breathing with no use of accessory muscles. Clear breath sounds in all lung fields bilaterally with no wheezes or crackles appreciated.  CARDIAC: RRR. Normal S1 and S2. No murmurs, clicks, or rubs appreciated.   ABDOMEN: Non-distended. Bowel sounds present in 4 quadrants. Soft and non-tender throughout with no masses or organomegaly.   NEUROLOGIC: Alert and oriented. CN II-XII intact bilaterally. Sensation to light touch involving upper and lower extremities intact bilaterally, including distal toes right foot.   EXTREMITIES: RLE splinted. Able to move bilateral toes.      Assessment/plan: continue on current plan as detailed in today's formal progress note.      Mayi Hamilton MD, PGY-2  [facility]  12/29/22      "

## 2022-12-30 NOTE — OP NOTE
Operative Note    Name:  Jaja Pratt  PCP:  Ellen Skinner  Procedure Date:  12/29/2022    Pre-Operative Diagnosis:  1. Right closed, comminuted displaced distal third tibial shaft fracture  2. Right closed nondisplaced vertical medial malleolus fracture with intra-articular extension into the tibial plafond   3. Right closed displaced comminuted distal fibula fracture    Post-Operative Diagnosis:    1. Right closed, comminuted displaced distal third tibial shaft fracture  2. Right closed nondisplaced vertical medial malleolus fracture with intra-articular extension into the tibial plafond   3. Right closed displaced comminuted distal fibula fracture    Procedure:   1. Right distal third tibia shaft fracture percutaneous reduction and insertion of intramedullary nail  2. Right medial malleolus open reduction internal fixation  3. Right distal fibula open reduction internal fixation    Surgeon: Anjel Degroot MD  Assistant: Clementina Grimes PA-C    A skilled assistant was necessary for this case to help with patient positioning, manipulation and prep of the operative extremity, soft tissue retraction, and safe/timely progression of the procedure.     Circulator: Tamar Sparks RN; Mya Mccloud RN  Radiology Technologist: Jennie Torrez ARRT  Scrub Person: José Manuel Dixon  X-Ray Technologist: John Salazar ARRT     Anesthesia Type: General anesthesia + local using 50 cc of 0.25% Marcaine without epinephrine    Tourniquet time: Not applicable    Estimated Blood Loss: 300 cc    Specimens: None    Complications: None apparent    Findings:   Closed displaced, comminuted distal 1/3 tibial shaft/metadiaphyseal fracture, with associated vertical intraarticular medial malleolus fracture and displaced comminuted distal fibula fracture. Mild-moderate swelling to RLE amendable for surgery. Skin intact without open wounds or abrasions. Mild duskiness of skin overlying fracture without skin  compromise. Reduction of tibial shaft fracture achieved with manual manipulation of the RLE along with percutaneous clamping x 2. Given intraarticular extension of vertical medial malleolus fracture, was felt to best be addressed with buttress plating. External rotation stress view after medial malleolus ORIF demonstrated persistent mild mortise widening and mild distal fibular fracture displacement, and therefore distal fibula ORIF was performed. Compartments remained soft throughout the entirety of the procedure. Distal pulses were strong and palpable with toes warm and well perfused at the end of the case.     Indication for Procedure:   Jaja Pratt is a pleasant 62 year old year old female with a past medical history notable for hypertension who was admitted to the hospital after a mechanical fall with right lower extremity injury.  She reports sustaining a fall as result of tripping over her pajama bottoms while on the stairs at her home, which resulted in an awkward twisting motion with sudden onset of pain and deformity at the right lower extremity.  She denies any other pain/injury and work-up was otherwise negative for acute injuries.  Imaging including x-rays and CT scan of the right lower extremity demonstrated displaced comminuted distal third tibial shaft fracture with extension into the metadiaphysis.  She similarly demonstrated a nondisplaced intra-articular vertical/oblique fracture of the medial malleolus with extension into the tibial plafond in a anteromedial to posterolateral direction involving the posterior malleolus.  Additionally she had a displaced comminuted distal fibula fracture.  This is a closed injury.  She remained neurovascularly intact throughout her stay in the emergency department.  She was splinted in the emergency department and orthopedics was consulted for assistance in definitive management. The patient was admitted to the hospitalist service, underwent medical  optimization, and was cleared for surgery by the hospitalist.    I discussed with her and her  the nature of her tibia and fibular fractures.  We did review her x-rays demonstrating the displaced distal third tibial shaft/metadiaphysis fracture in addition to the medial malleoli fracture and distal fibula fracture.  We discussed treatment options both nonoperative as well as operative.  The risks and benefits of both were reviewed.  Given the unstable nature of her tibial shaft/metadiaphysis fracture in addition to the associated medial malleolus and distal fibular fracture, we did discuss my recommendation for surgical management to better improve her alignment and allow for stability while bone healing takes place.  I discussed the nature of surgery, including the associated risks, benefits, alternatives, limitations, anticipated postoperative recovery and timeline.  Risks including but not limited to complications with anesthesia, infection, wound complications, bleeding, damage to surrounding structures, DVT, persistent pain including anterior knee pain, stiffness, symptomatic hardware, fracture displacement, nonunion, malunion, potential need for future surgery were reviewed.  I discussed my plan for an estimated 6 weeks of nonweightbearing to the right lower extremity.  We did discuss recommendation for admission for pain control, serial CMS exams, and work with therapy postoperatively.  Following our conversation she and her  expressed a good understanding, and wished to proceed with surgery despite the risks involved.  All questions were answered.  Written informed consent for open versus closed reduction and placement of right tibial intramedullary nail, open reduction internal fixation right medial malleolus, possible open reduction internal fixation right fibula was obtained.  The right lower extremity was marked per myself.    Description of Procedure:   The patient was met in the  preoperative holding area. The correct operative site was identified and marked with indelible ink. After being informed of the risks, benefits, and alternatives of the procedure as above, the patient desired to proceed with surgery, and written informed consent was obtained. The patient met with the anesthesia team in the preoperative holding area.The patient was then brought to the operating suite, where she underwent general endotracheal anesthesia. She was placed in the supine position on the operative room table. All bony prominences were well padded. The patient received 2 grams of Ancef preoperatively. A non sterile tourniquet was placed to the right proximal thigh.  The the right lower extremity was then prepped and draped in the typical sterile fashion. A timeout  was performed by all operating room staff to confirm patient, procedure to be performed, laterality, and equipment needs. All were in agreement without safety concerns.     Attention was turned to the right knee.  A longitudinal midline incision was created proximal to the superior pole of the patella sharply with #10 blade.  Deep dissection was performed with electrocautery to ensure adequate hemostasis.  The quadricep tendon was then identified, cleared of all overlying fascia, and the central portion of the tendon was sharply divided carefully with #15 blade.  This was slightly extended distally in a medial parapatellar fashion to the proximal quarter of the patella to aid in additional mobilization of the patella to allow for adequate placement of the suprapatellar tibial nailing soft tissue protector and avoid undue pressure across the patellofemoral joint.  Hemostasis was achieved.  Next the suprapatellar guide was placed carefully across the patellofemoral joint, and under intraoperative fluoroscopic guidance was placed at the starting point for opening reaming at the proximal tibia.  Using AP and lateral fluoroscopic imaging, appropriate  start point was identified at the medial aspect of the lateral tibial spine on AP imaging and at the apex/anterior edge of the proximal tibial plateau.  Once appropriate start point was identified on fluoroscopy, guidewire was then advanced.  Next the proximal tibial opening reamer was used to enter into the tibial canal, this was carefully done with fluoroscopic guidance.  Next a ball-tipped guidewire was then advanced into the tibial canal, and brought just proximal to the level of the fracture site.  Close reduction under fluoroscopic guidance was then performed, and near anatomic reduction was achieved with direct manipulation of the distal right lower extremity.  In order to improve alignment and maintain reduction, under fluoroscopic guidance two large Fenton bone clamps were placed percutaneously to reduce the large butterfly fragment both to the proximal tibial fragment and distal tibial fragment. After skin incision was made with #15 blade, careful dissection with hemostat was performed down to bone, and clamps were carefully brought to bone under fluroscopy guidance. Once adequate alignment was achieved, the ball-tipped guidewire was then advanced distally just past the physeal scar and centered at the tibiotalar joint both on AP and lateral imaging.  Next, given the presence of the nondisplaced medial malleoli fragment which did involve the central and posterior portion of the tibial plafond, in order to prevent further displacement with reaming and nail placement, 2 threaded K wires were felt to be useful aids in stabilizing this fragment.  In order to perform this, a longitudinal medial incision centered over the medial malleolus was created sharply with scalpel followed by careful dissection with Metzenbaum scissors down to bone.  Care was taken to be mindful of the saphenous nerve and vein, though were not encountered during this dissection.  Hemostasis was achieved with electrocautery.  Under direct  fluoroscopic guidance on both AP and lateral imaging, 2 threaded K wires were placed in a medial to lateral fashion just above the subchondral bone with care being taken to be parallel to the joint and not violate the tibiotalar joint space.  These were placed in a position to be out of the way of future reaming and nail placement.  Nail length was then measured, and was approximately 325 mm, and as to avoid too distal of nail placement and potential prominence proximally, a 315 mm nail was felt to be most appropriate.  Sequential reaming was then performed under fluoroscopic guidance to ensure no fracture displacement. Reaming began at 8.5 mm and up to 11.5 mm where at that point good cortical chatter was noted and a 10 mm diameter nail was felt to be most appropriate.  At this point the 10x315 mm Synthes advanced tibial nail was opened and assembled to the insertion jig.  Under fluoroscopic guidance the nail was advanced, with care taken to ensure maintenance of fracture alignment on both AP and lateral imaging.  The nail was placed distal enough to ensure adequate distal locking screw placement within the distal fragment.  Imaging at the knee ensured nail was appropriately recessed within bone and not prominent.  Next 2 proximal interlocking screws were placed using the aiming arm, and fluoroscopy ensuring appropriate length and positioning of the screws.  Gentle axial impaction at the heel was performed to aid in further reduction/compression at the fracture site, and fluoroscopy demonstrated appropriate reduction in both AP and lateral planes.  Next using perfect [place] technique, 2 medial to lateral interlocking screws were placed through the previously created medial incision. A third oblique anteromedial to posterolateral interlocking screw was placed at the distal aspect of the nail through a small percutaneous incision, and care was taken to gently spread with hemostat down to bone prior to drilling and  placing the screw.  Fluoroscopy ensured appropriate positioning and length of these distal interlocking screws.  Fracture reduction/alignment was maintained as confirmed on fluoroscopy.    Next, given the aforementioned nature of the nondisplaced vertical medial malleolus fragment,, it was felt that this would be best managed via buttress plating to ensure no additional displacement.  Using the previously created medial longitudinal incision, fluoroscopy was used for sizing of an appropriate 1/3 tubular plate.  A 5 hole plate was felt to be most appropriate.  The fracture extended proximally just at the level of the most distal medial to lateral interlocking screw.  In order to allow for screw placement past the previously placed nail, then plate was centered just anterior to the nail.  The plate was gently precontoured.  This was then held in place with 2 K wires, and AP and lateral imaging were used to ensure appropriate positioning.  Next a 3.5 mm cortical screw was placed under fluoroscopic guidance just proximal to the exit point of the vertical fracture line in buttress fashion, with good compression of the plate down to bone at the apex of the fracture.  2 additional 3.5 mm cortical screws were placed proximal.  In order to gain compression at the nondisplaced fracture  to the joint, in lag by technique fashion, 2 distal 3.5 mm cancellous screws were placed through the plate parallel to the joint line under fluoroscopic guidance.  Fluoroscopy confirmed avoidance of the tibial nail and appropriate screw length and positioning.    Next external rotation stress test at the ankle was performed with fluoroscopy, and this did demonstrate slight gapping at the medial mortise and mild distal fibula fracture displacement.  Given this finding, decision was made to proceed with open reduction internal fixation of the distal fibula fracture.  A longitudinal incision was created centered over the fibula.  Skin  was incised with #15 blade, and careful dissection with Metzenbaum scissors was performed down to bone.  Hemostasis was achieved with electrocautery.  Attention was paid to be mindful of the superficial peroneal nerve, though was not identified during dissection.  Once down to bone, a wood handle elevator was used to elevate fascia off of the lateral aspect of the fibula.  The fracture was identified, debris removed between fracture fragments.  There was a posterior butterfly fragment noted that was felt to be amenable to clamping and placement of lag screws by technique.  2, 2.7 mm cortical screws were placed in lag by technique in a anterior to posterior fashion with good compression achieved at the fracture site.  Fluoroscopy was used to ensure adequate fracture alignment and appropriate screw placement and length.  Next, with fluoroscopic guidance, a 8 hole 1/3 tubular plate was felt to be most appropriate in length to span the central comminuted portion of the distal fibular fracture.  Two 3.5 mm cortical screws were placed proximal and distal to the fracture site to appropriately bring plate flush to the lateral fibular cortex.  Fluoroscopy assured appropriate plate position and maintenance of fracture reduction.  2 additional nonlocking screws were placed proximally within the plate with good bony purchase.  2 nonlocking screws were placed distally.  The initial previously placed cortical screws above and below the fracture were then retightened, with inadequate bony purchase and were exchanged for locking screws.  When tightening the exchanged distal locking screw, the threads did not engage within the plate and were noted to be stripped at the plate, and therefore this was removed, and a locking screw was placed at the hole just to proximal to this.  Repeat external rotation stress test at the joint was then performed once more with stability noted of the tibiotalar joint.  Final fluoroscopic AP and lateral  imaging was obtained.    The wounds were then copiously irrigated with normal sterile saline.  Hemostasis was ensured with electrocautery.  Closure was then performed.  The knee arthrotomy proximally was closed with interrupted 0 Vicryl sutures, followed by deep adipose layer closure with 0 Vicryl, followed by 2-0 Vicryl for deep dermal layer and interrupted 3-0 nylon suture.  The fascia over the lateral fibula was closed with 0 Vicryl followed by interrupted 2-0 Vicryl for deep dermal layer and interrupted 3-0 Vicryl for skin.  The medial longitudinal incision was closed with deep interrupted 0 Vicryl for adipose layer followed by 2-0 Vicryl for deep dermal layer and 3-0 nylon for skin.  The small percutaneous incisions were closed with 2-0 Vicryl for deep dermal layer and interrupted 3-0 nylon for skin.  Sterile dressings of Xeroform, 4 x 4 gauze, ABDs, and sterile cast padding were placed.  The toes were warm and well perfused with a strong palpable DP pulse noted postoperatively.  Drapes were taken down, and a well-padded bulky Avery short leg splint was applied which included both a posterior slab and medial/lateral stirrups.  Ace wrap was applied.  The patient was then awakened from anesthesia without complication and transferred to a regular bed. All sponge and needle counts were correct. The patient was then taken from the operating room to the PACU in stable condition. There were no apparent complications identified.     Post-Operative Plan:  Activity: Up with assist and assistive device (walker) until independent.  Weight bearing status: NWB RLE x 6 weeks.  Pain management: Transition from IV to PO as tolerated.    Antibiotics: Ancef x 24 hours.  Diet: Begin with clear fluids and progress diet as tolerated.   DVT prophylaxis: ASA 81 mg BID and mechanical while in the hospital, discharge on ASA 81 mg BID x 6 weeks.  Imaging: PACU.  Labs: Hgb POD#1.  Bracing/Splinting: keep bulky avery splint in place and  c/d/i until follow up  Elevation: Elevate RLE on pillows to keep above the level of the heart as much as possible.   Physical Therapy/Occupational Therapy: Eval and treat.  Consults: PT/OT.  Follow-up: Clinic in 2 weeks for suture removal with repeat x-rays needed.   Disposition: Pending progress with therapies, pain control on orals, and medical stability, discharge to home vs TCU TBD       Anjel Degroot MD   Orthopedic Surgery   [facility]    Date: 12/29/2022  Time: 8:10 PM      Implant Name Type Inv. Item Serial No.  Lot No. LRB No. Used Action   Tibial nail advanced 10mm    Blip [id number] Right 1 Implanted   SCREW BN 36MM 5MM LCK X25 IM NL 04.045.036S - [id number] Metallic Hardware/[employer] SCREW BN 36MM 5MM LCK X25 IM NL 04.045.036S  [id number] Right 1 Implanted   LOCKING SCREW FOR IM NAIL     Blip [id number] Right 1 Implanted   SCREW BN 40MM 5MM LCK X25 STRL IM NL 04.045.040S - [id number] Metallic Hardware/[employer] SCREW BN 40MM 5MM LCK X25 STRL IM NL 04.045.040S  [employer] [id number] Right 1 Implanted   SCREW BN 32MM 5MM LCK X25 STRL IM NL 04.045.032S - [id number] Metallic Hardware/[employer] SCREW BN 32MM 5MM LCK X25 STRL IM NL 04.045.032S  Blip-STRATEC [id number] Right 1 Implanted   SCREW BN 42MM 5MM LCK X25 STRL IM NL 04.045.042S - [id number] Metallic Hardware/[employer] SCREW BN 42MM 5MM LCK X25 STRL IM NL 04.045.042S  [employer] [id number] Right 1 Implanted   IMP SCR SYN CORTEX 2.7X16MM SELF TAP .816 - [id number] Metallic Hardware/[employer] IMP SCR SYN CORTEX 2.7X16MM SELF TAP .816  SYNTHES-STRATEC NA Right 2 Implanted   IMP SCR SYN 3.5X14MM LOCKING W/STARDRIVE .103 - [id number] Metallic Hardware/[employer] IMP SCR SYN 3.5X14MM LOCKING W/STARDRIVE .103  SYNTHES-STRATEC NA Right 1 Implanted   IMP SCR SYN 3.5X16MM LOCKING W/STARDRIVE .104 - [id number] Metallic Hardware/[employer] IMP SCR SYN 3.5X16MM LOCKING W/STARDRIVE .104  [facility] Right  2 Implanted   IMP SCR SYN CORTEX 3.5X14MM SELF TAP .814 - [id number] Metallic Hardware/[place] IMP SCR SYN CORTEX 3.5X14MM SELF TAP .814  SYNTHES-STRATEC NA Right 3 Implanted   IMP SCR SYN CORTEX 3.5X16MM SELF TAP .816 - [id number] Metallic Hardware/[place] IMP SCR SYN CORTEX 3.5X16MM SELF TAP .816  SYNTHES-STRATEC NA Right 2 Implanted   IMP SCR SYN CORTEX 3.5X30MM SELF TAP .830 - [id number] Metallic Hardware/[place] IMP SCR SYN CORTEX 3.5X30MM SELF TAP .830  SYNTHES-STRATEC NA Right 1 Implanted   IMP SCR SYN CORTEX 3.5X32MM SELF TAP .832 - [id number] Metallic Hardware/[place] IMP SCR SYN CORTEX 3.5X32MM SELF TAP .832  SYNTHES-STRATEC NA Right 1 Implanted   IMP SCR SYN CORTEX 3.5X34MM SELF TAP .834 - [id number] Metallic Hardware/[place] IMP SCR SYN CORTEX 3.5X34MM SELF TAP .834  SYNTHES-STRATEC NA Right 1 Implanted   IMP SCR SYN CORTEX 3.5X36MM SELF TAP .836 - [id number] Metallic Hardware/[place] IMP SCR SYN CORTEX 3.5X36MM SELF TAP .836  SYNTHES-[employer]ATEC NA Right 1 Implanted   IMP SCR SYN CORTEX 3.5X38MM SELF TAP .838 - [id number] Metallic Hardware/[place] IMP SCR SYN CORTEX 3.5X38MM SELF TAP .838  SYNTHES-STRATEC NA Right 1 Implanted   IMP PLATE SYN 1/3 TUBULAR 57MM 05H .351 - [id number] Metallic Hardware/[place] IMP PLATE SYN 1/3 TUBULAR 57MM 05H .351  SYNTHES-STRATEC NA Right 1 Implanted   IMP SCR SYN CAN 4.0X40MM FT .040 - [id number] Metallic Hardware/[place] IMP SCR SYN CAN 4.0X40MM FT .040  SYNTHES-STRATEC NA Right 2 Implanted

## 2022-12-30 NOTE — PROGRESS NOTES
Physical Therapy Discharge Summary    Reason for therapy discharge:    Discharged to home.  All goals and outcomes met, no further needs identified.    Progress towards therapy goal(s). See goals on Care Plan in [facility] electronic health record for goal details.  Goals met    Therapy recommendation(s):    Continue home exercise program.

## 2024-05-16 ENCOUNTER — TRANSFERRED RECORDS (OUTPATIENT)
Dept: HEALTH INFORMATION MANAGEMENT | Facility: CLINIC | Age: 64
End: 2024-05-16
Payer: COMMERCIAL

## 2024-07-08 ENCOUNTER — TRANSFERRED RECORDS (OUTPATIENT)
Dept: HEALTH INFORMATION MANAGEMENT | Facility: CLINIC | Age: 64
End: 2024-07-08
Payer: COMMERCIAL

## 2024-07-22 ENCOUNTER — TRANSFERRED RECORDS (OUTPATIENT)
Dept: HEALTH INFORMATION MANAGEMENT | Facility: CLINIC | Age: 64
End: 2024-07-22
Payer: COMMERCIAL

## 2024-08-19 ENCOUNTER — TRANSFERRED RECORDS (OUTPATIENT)
Dept: HEALTH INFORMATION MANAGEMENT | Facility: CLINIC | Age: 64
End: 2024-08-19
Payer: COMMERCIAL

## 2025-05-26 NOTE — PHARMACY-ADMISSION MEDICATION HISTORY
Pharmacy Note - Admission Medication History    Pertinent Provider Information: None     ______________________________________________________________________    Prior To Admission (PTA) med list completed and updated in EMR.       PTA Med List   Medication Sig Last Dose     amLODIPine (NORVASC) 10 MG tablet Take 10 mg by mouth daily 12/28/2022       [facility]  Method of interview communication: in-person    Summary of Changes to PTA Med List  New: NA  Discontinued: NA  Changed: NA    Patient was asked about OTC/herbal products specifically.  PTA med list reflects this.    In the past week, patient estimated taking medication this percent of the time:  Unable to assess    Allergies were reviewed, assessed, and updated with the patient.      Patient does not use any multi-dose medications prior to admission.    The information provided in this note is only as accurate as the sources available at the time of the update(s).    Thank you for the opportunity to participate in the care of this patient.    Melonie Luz [facility]  12/29/2022 8:26 AM   normal (ped)...

## (undated) DEVICE — WIRE GUIDE 3.2X400MM  357.399

## (undated) DEVICE — SUTURE VICRYL+ 2-0 27IN CT-1 UND VCP259H

## (undated) DEVICE — PLATE GROUNDING ADULT W/CORD 9165L

## (undated) DEVICE — DRESSING XEROFORM PETROLATUM 5X9 33605

## (undated) DEVICE — SUCTION MANIFOLD NEPTUNE 2 SYS 1 PORT 702-025-000

## (undated) DEVICE — SLEEVE DRILL SYN SUPRAPATELLAR 12MM 03.010.437S

## (undated) DEVICE — DRSG ABD TNDRSRB WET PRUF 8IN X 10IN STRL  9194A

## (undated) DEVICE — DRSG GAUZE 4X4" TRAY 6939

## (undated) DEVICE — DRILL BIT SYN QUICK COUPLING 2.8X165MM 310.288

## (undated) DEVICE — DRAPE C-ARM 60X42" 1013

## (undated) DEVICE — Device

## (undated) DEVICE — GLOVE BIOGEL INDICATOR 7.5 LF 41675

## (undated) DEVICE — MAT FLOOR SURGICAL 40X38 0702140238

## (undated) DEVICE — PREP CHLORAPREP 26ML TINTED HI-LITE ORANGE 930815

## (undated) DEVICE — SUTURE VICRYL+ 0 27IN CT-1 UND VCP260H

## (undated) DEVICE — DRILL BIT QUICK COUPLING 2.5X110MM GOLD 310.25

## (undated) DEVICE — IMP SCR SYN CAN 4.0X35MM FT SS 206.035: Type: IMPLANTABLE DEVICE | Site: LEG | Status: NON-FUNCTIONAL

## (undated) DEVICE — GLOVE BIOGEL PI ULTRATOUCH G SZ 6.5 42165

## (undated) DEVICE — DRILL BIT QUICK COUPLING 3 FLUTE 4.2MMX145MM NDL  POINT

## (undated) DEVICE — SUTURE VICRYL+ 3-0 18IN PS-2 UND VCP497H

## (undated) DEVICE — BANDAGE ELASTIC 6X550 LF DBL 593-96LF

## (undated) DEVICE — GLOVE UNDER INDICATOR PI SZ 7.0 LF 41670

## (undated) DEVICE — GLOVE BIOGEL PI ULTRATOUCH G SZ 7.5 42175

## (undated) DEVICE — DRAPE C-ARMOR 5 SIDED 5523

## (undated) DEVICE — IMP SCR SYN CAN 4.0X45MM FT SS 206.045: Type: IMPLANTABLE DEVICE | Site: LEG | Status: NON-FUNCTIONAL

## (undated) DEVICE — HOLDER LIMB VELCRO OR 0814-1533

## (undated) DEVICE — ROD SYN REAMER BALL TIP 2.5X1150MM 351.708S

## (undated) DEVICE — CUSTOM PACK TOTAL KNEE SOP5BTKHEC

## (undated) DEVICE — SOL NACL 0.9% IRRIG 1000ML BOTTLE 2F7124

## (undated) DEVICE — SOL WATER IRRIG 1000ML BOTTLE 2F7114

## (undated) RX ORDER — ONDANSETRON 2 MG/ML
INJECTION INTRAMUSCULAR; INTRAVENOUS
Status: DISPENSED
Start: 2022-12-29

## (undated) RX ORDER — FENTANYL CITRATE 50 UG/ML
INJECTION, SOLUTION INTRAMUSCULAR; INTRAVENOUS
Status: DISPENSED
Start: 2022-12-29

## (undated) RX ORDER — DEXAMETHASONE SODIUM PHOSPHATE 10 MG/ML
INJECTION, EMULSION INTRAMUSCULAR; INTRAVENOUS
Status: DISPENSED
Start: 2022-12-29

## (undated) RX ORDER — LIDOCAINE HYDROCHLORIDE 10 MG/ML
INJECTION, SOLUTION EPIDURAL; INFILTRATION; INTRACAUDAL; PERINEURAL
Status: DISPENSED
Start: 2022-12-29

## (undated) RX ORDER — PROPOFOL 10 MG/ML
INJECTION, EMULSION INTRAVENOUS
Status: DISPENSED
Start: 2022-12-29

## (undated) RX ORDER — FENTANYL CITRATE-0.9 % NACL/PF 10 MCG/ML
PLASTIC BAG, INJECTION (ML) INTRAVENOUS
Status: DISPENSED
Start: 2022-12-29

## (undated) RX ORDER — CEFAZOLIN SODIUM 1 G/3ML
INJECTION, POWDER, FOR SOLUTION INTRAMUSCULAR; INTRAVENOUS
Status: DISPENSED
Start: 2022-12-29